# Patient Record
Sex: FEMALE | Race: WHITE | NOT HISPANIC OR LATINO | Employment: OTHER | URBAN - METROPOLITAN AREA
[De-identification: names, ages, dates, MRNs, and addresses within clinical notes are randomized per-mention and may not be internally consistent; named-entity substitution may affect disease eponyms.]

---

## 2023-01-29 PROBLEM — H40.9 GLAUCOMA: Status: ACTIVE | Noted: 2023-01-29

## 2023-01-29 PROBLEM — M54.50 CHRONIC LOW BACK PAIN: Status: ACTIVE | Noted: 2023-01-29

## 2023-01-29 PROBLEM — J02.9 SORE THROAT: Status: ACTIVE | Noted: 2023-01-29

## 2023-01-29 PROBLEM — S32.030S COMPRESSION FRACTURE OF L3 LUMBAR VERTEBRA, SEQUELA: Status: RESOLVED | Noted: 2023-01-29 | Resolved: 2023-01-29

## 2023-01-29 PROBLEM — G89.29 CHRONIC LOW BACK PAIN: Status: ACTIVE | Noted: 2023-01-29

## 2023-01-29 PROBLEM — S32.030S COMPRESSION FRACTURE OF L3 LUMBAR VERTEBRA, SEQUELA: Status: ACTIVE | Noted: 2023-01-29

## 2023-01-29 PROBLEM — E53.8 VITAMIN B12 DEFICIENCY: Status: ACTIVE | Noted: 2023-01-29

## 2023-01-29 PROBLEM — H53.9 VISUAL DISTURBANCE: Status: ACTIVE | Noted: 2023-01-29

## 2023-01-29 PROBLEM — I26.99 PULMONARY EMBOLUS (MULTI): Status: ACTIVE | Noted: 2023-01-29

## 2023-01-29 PROBLEM — E66.3 OVERWEIGHT (BMI 25.0-29.9): Status: ACTIVE | Noted: 2023-01-29

## 2023-01-29 PROBLEM — I10 HTN (HYPERTENSION): Status: ACTIVE | Noted: 2023-01-29

## 2023-01-29 PROBLEM — L70.9 ACNE: Status: ACTIVE | Noted: 2023-01-29

## 2023-01-29 PROBLEM — R68.83 CHILLS: Status: ACTIVE | Noted: 2023-01-29

## 2023-01-29 PROBLEM — M85.80 OSTEOPENIA: Status: ACTIVE | Noted: 2023-01-29

## 2023-01-29 PROBLEM — N39.0 UTI (URINARY TRACT INFECTION): Status: ACTIVE | Noted: 2023-01-29

## 2023-01-29 PROBLEM — R73.9 HYPERGLYCEMIA: Status: ACTIVE | Noted: 2023-01-29

## 2023-01-29 PROBLEM — E01.0 THYROMEGALY: Status: ACTIVE | Noted: 2023-01-29

## 2023-01-29 PROBLEM — H26.9 CATARACT: Status: ACTIVE | Noted: 2023-01-29

## 2023-01-29 PROBLEM — L72.0 EPIDERMAL INCLUSION CYST: Status: ACTIVE | Noted: 2023-01-29

## 2023-01-29 PROBLEM — E55.9 VITAMIN D DEFICIENCY: Status: ACTIVE | Noted: 2023-01-29

## 2023-01-29 PROBLEM — M19.90 OSTEOARTHRITIS: Status: ACTIVE | Noted: 2023-01-29

## 2023-01-29 PROBLEM — J02.9 PHARYNGITIS: Status: ACTIVE | Noted: 2023-01-29

## 2023-01-29 PROBLEM — F32.A DEPRESSION: Status: ACTIVE | Noted: 2023-01-29

## 2023-01-29 PROBLEM — H91.92 UNILATERAL HEARING LOSS, LEFT: Status: ACTIVE | Noted: 2023-01-29

## 2023-01-29 PROBLEM — M51.9 LUMBOSACRAL DISC DISEASE: Status: ACTIVE | Noted: 2023-01-29

## 2023-01-29 PROBLEM — N32.81 OVERACTIVE BLADDER: Status: ACTIVE | Noted: 2023-01-29

## 2023-01-29 PROBLEM — R09.89 CHOKING IN ADULT: Status: ACTIVE | Noted: 2023-01-29

## 2023-01-29 PROBLEM — R92.8 ABNORMAL MAMMOGRAM OF RIGHT BREAST: Status: ACTIVE | Noted: 2023-01-29

## 2023-01-29 PROBLEM — S20.212A RIB CONTUSION, LEFT, INITIAL ENCOUNTER: Status: ACTIVE | Noted: 2023-01-29

## 2023-01-29 PROBLEM — M17.11 PRIMARY OSTEOARTHRITIS OF RIGHT KNEE: Status: ACTIVE | Noted: 2023-01-29

## 2023-01-29 PROBLEM — Z86.718 HISTORY OF DVT (DEEP VEIN THROMBOSIS): Status: ACTIVE | Noted: 2023-01-29

## 2023-01-29 PROBLEM — R42 DIZZINESS: Status: ACTIVE | Noted: 2023-01-29

## 2023-01-29 PROBLEM — K21.9 ACID REFLUX: Status: ACTIVE | Noted: 2023-01-29

## 2023-01-29 PROBLEM — R39.9 UTI SYMPTOMS: Status: ACTIVE | Noted: 2023-01-29

## 2023-01-29 PROBLEM — R26.81 UNSTEADY GAIT: Status: ACTIVE | Noted: 2023-01-29

## 2023-01-29 PROBLEM — R22.1 MASS OF THYROID REGION: Status: ACTIVE | Noted: 2023-01-29

## 2023-01-29 PROBLEM — I65.29 CAROTID STENOSIS: Status: ACTIVE | Noted: 2023-01-29

## 2023-01-29 PROBLEM — I80.3 THROMBOPHLEBITIS OF LOWER EXTREMITIES: Status: ACTIVE | Noted: 2023-01-29

## 2023-01-29 PROBLEM — K44.9 HIATAL HERNIA: Status: ACTIVE | Noted: 2023-01-29

## 2023-01-29 PROBLEM — L30.9 ECZEMATOUS DERMATITIS: Status: ACTIVE | Noted: 2023-01-29

## 2023-01-29 PROBLEM — R73.03 PRE-DIABETES: Status: ACTIVE | Noted: 2023-01-29

## 2023-01-29 PROBLEM — R82.90 ABNORMAL FINDING ON URINALYSIS: Status: ACTIVE | Noted: 2023-01-29

## 2023-01-29 PROBLEM — M81.0 OSTEOPOROSIS: Status: ACTIVE | Noted: 2023-01-29

## 2023-01-29 PROBLEM — F41.9 ANXIETY: Status: ACTIVE | Noted: 2023-01-29

## 2023-01-29 PROBLEM — L72.3 SEBACEOUS CYST OF RIGHT AXILLA: Status: ACTIVE | Noted: 2023-01-29

## 2023-01-29 PROBLEM — N60.19 FIBROCYSTIC BREAST DISEASE: Status: ACTIVE | Noted: 2023-01-29

## 2023-01-29 PROBLEM — G62.9 PERIPHERAL NEUROPATHY: Status: ACTIVE | Noted: 2023-01-29
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
## 2023-01-29 PROBLEM — S80.12XS HEMATOMA OF LEG, LEFT, SEQUELA: Status: ACTIVE | Noted: 2023-01-29

## 2023-01-29 PROBLEM — E73.9 LACTOSE INTOLERANCE IN ADULT: Status: ACTIVE | Noted: 2023-01-29

## 2023-01-29 RX ORDER — NITROFURANTOIN 25; 75 MG/1; MG/1
CAPSULE ORAL
COMMUNITY
Start: 2022-08-18 | End: 2023-04-04 | Stop reason: ALTCHOICE

## 2023-01-29 RX ORDER — ERGOCALCIFEROL 1.25 MG/1
1 CAPSULE ORAL
COMMUNITY
Start: 2022-06-29

## 2023-01-29 RX ORDER — CALCIUM CITRATE/VITAMIN D3 315MG-5MCG
1 TABLET ORAL 2 TIMES DAILY
COMMUNITY

## 2023-01-29 RX ORDER — SPIRONOLACTONE 25 MG
TABLET ORAL
COMMUNITY
Start: 2019-11-20 | End: 2023-05-11 | Stop reason: SDUPTHER

## 2023-01-29 RX ORDER — MAGNESIUM 250 MG
TABLET ORAL
COMMUNITY
End: 2024-03-21 | Stop reason: SDUPTHER

## 2023-01-29 RX ORDER — TIMOLOL MALEATE 5 MG/ML
1 SOLUTION/ DROPS OPHTHALMIC EVERY 12 HOURS
COMMUNITY
Start: 2019-11-20

## 2023-01-29 RX ORDER — TRAVOPROST OPHTHALMIC SOLUTION 0.04 MG/ML
1 SOLUTION OPHTHALMIC NIGHTLY
COMMUNITY
End: 2023-05-11 | Stop reason: SDUPTHER

## 2023-01-29 RX ORDER — EPINEPHRINE 0.22MG
AEROSOL WITH ADAPTER (ML) INHALATION
COMMUNITY

## 2023-01-29 RX ORDER — MECLIZINE HCL 12.5 MG 12.5 MG/1
TABLET ORAL
COMMUNITY
Start: 2021-10-05

## 2023-01-29 RX ORDER — CALCIUM CARB/VITAMIN D3/VIT K1 500-500-40
TABLET,CHEWABLE ORAL
COMMUNITY

## 2023-01-29 RX ORDER — AMLODIPINE BESYLATE 10 MG/1
1 TABLET ORAL DAILY
COMMUNITY
Start: 2019-11-20 | End: 2023-06-22

## 2023-01-29 RX ORDER — WARFARIN SODIUM 5 MG/1
TABLET ORAL
COMMUNITY
Start: 2020-09-23 | End: 2024-04-11 | Stop reason: SDUPTHER

## 2023-01-29 RX ORDER — HYDROCORTISONE 25 MG/G
CREAM TOPICAL
COMMUNITY
Start: 2022-04-20 | End: 2023-07-05

## 2023-01-29 RX ORDER — OMEPRAZOLE 40 MG/1
CAPSULE, DELAYED RELEASE ORAL
COMMUNITY
Start: 2020-01-22 | End: 2024-01-17 | Stop reason: ALTCHOICE

## 2023-01-29 RX ORDER — UBIDECARENONE 75 MG
CAPSULE ORAL
COMMUNITY
End: 2023-05-11 | Stop reason: SDUPTHER

## 2023-01-29 RX ORDER — MUPIROCIN 20 MG/G
OINTMENT TOPICAL
COMMUNITY
Start: 2021-01-27

## 2023-01-29 RX ORDER — OXYBUTYNIN CHLORIDE 10 MG/1
10 TABLET, EXTENDED RELEASE ORAL NIGHTLY
COMMUNITY

## 2023-01-29 RX ORDER — RISEDRONATE SODIUM 35 MG/1
35 TABLET, FILM COATED ORAL
COMMUNITY
End: 2023-06-26 | Stop reason: SDUPTHER

## 2023-01-29 RX ORDER — BACLOFEN 10 MG/1
10 TABLET ORAL 2 TIMES DAILY
COMMUNITY

## 2023-01-29 RX ORDER — AFLIBERCEPT 40 MG/ML
INJECTION, SOLUTION INTRAVITREAL
COMMUNITY
End: 2024-03-21 | Stop reason: ALTCHOICE

## 2023-03-08 LAB — URINE CULTURE: NORMAL

## 2023-03-14 DIAGNOSIS — N95.2 VAGINAL ATROPHY: Primary | ICD-10-CM

## 2023-03-14 RX ORDER — ESTRADIOL 0.1 MG/G
2 CREAM VAGINAL DAILY
Qty: 42.5 G | Refills: 5 | Status: SHIPPED | OUTPATIENT
Start: 2023-03-14 | End: 2024-03-13

## 2023-04-03 RX ORDER — OMEPRAZOLE 20 MG/1
1 TABLET, DELAYED RELEASE ORAL DAILY
COMMUNITY

## 2023-04-03 RX ORDER — MIRABEGRON 25 MG/1
1 TABLET, FILM COATED, EXTENDED RELEASE ORAL DAILY
COMMUNITY
Start: 2023-03-06

## 2023-04-03 RX ORDER — NITROFURANTOIN (MACROCRYSTALS) 100 MG/1
1 CAPSULE ORAL 2 TIMES DAILY
COMMUNITY
Start: 2022-06-29 | End: 2023-04-04 | Stop reason: ALTCHOICE

## 2023-04-03 RX ORDER — PREDNISONE 10 MG/1
TABLET ORAL
COMMUNITY
Start: 2022-08-31 | End: 2024-03-21 | Stop reason: ALTCHOICE

## 2023-04-03 RX ORDER — ASCORBIC ACID 500 MG
TABLET,CHEWABLE ORAL
COMMUNITY
Start: 2023-03-06

## 2023-04-03 RX ORDER — NALOXONE HYDROCHLORIDE 4 MG/.1ML
4 SPRAY NASAL
COMMUNITY
Start: 2022-07-24

## 2023-04-03 RX ORDER — HYDROCODONE BITARTRATE AND ACETAMINOPHEN 5; 325 MG/1; MG/1
TABLET ORAL
COMMUNITY
Start: 2022-07-24 | End: 2024-01-17 | Stop reason: ALTCHOICE

## 2023-04-04 ENCOUNTER — OFFICE VISIT (OUTPATIENT)
Dept: PRIMARY CARE | Facility: CLINIC | Age: 83
End: 2023-04-04
Payer: MEDICARE

## 2023-04-04 VITALS
RESPIRATION RATE: 16 BRPM | DIASTOLIC BLOOD PRESSURE: 80 MMHG | HEART RATE: 63 BPM | OXYGEN SATURATION: 97 % | TEMPERATURE: 97.7 F | BODY MASS INDEX: 26.84 KG/M2 | HEIGHT: 67 IN | WEIGHT: 171 LBS | SYSTOLIC BLOOD PRESSURE: 148 MMHG

## 2023-04-04 DIAGNOSIS — R73.9 HYPERGLYCEMIA: ICD-10-CM

## 2023-04-04 DIAGNOSIS — N30.90 CYSTITIS: Primary | ICD-10-CM

## 2023-04-04 DIAGNOSIS — R39.9 UTI SYMPTOMS: ICD-10-CM

## 2023-04-04 DIAGNOSIS — Z86.718 HISTORY OF DVT (DEEP VEIN THROMBOSIS): ICD-10-CM

## 2023-04-04 LAB
APPEARANCE UR: CLEAR
BILIRUB UR QL STRIP: NEGATIVE
COLOR UR: YELLOW
GLUCOSE UR STRIP-MCNC: NEGATIVE MG/DL
HGB UR QL STRIP: ABNORMAL
KETONES UR STRIP-MCNC: NEGATIVE MG/DL
LEUKOCYTE ESTERASE UR QL STRIP: ABNORMAL
NITRITE UR QL STRIP: NEGATIVE
PH UR STRIP: 6 [PH]
POC FINGERSTICK BLOOD GLUCOSE: 144 MG/DL (ref 70–100)
POC INR: 1.6 (ref 0.9–1.1)
PROT UR STRIP-MCNC: NEGATIVE MG/DL
SP GR UR STRIP.AUTO: 1.01
UROBILINOGEN UR STRIP-ACNC: 0.2 E.U./DL

## 2023-04-04 PROCEDURE — 3077F SYST BP >= 140 MM HG: CPT | Performed by: FAMILY MEDICINE

## 2023-04-04 PROCEDURE — 1160F RVW MEDS BY RX/DR IN RCRD: CPT | Performed by: FAMILY MEDICINE

## 2023-04-04 PROCEDURE — 1036F TOBACCO NON-USER: CPT | Performed by: FAMILY MEDICINE

## 2023-04-04 PROCEDURE — 85610 PROTHROMBIN TIME: CPT | Performed by: FAMILY MEDICINE

## 2023-04-04 PROCEDURE — 1159F MED LIST DOCD IN RCRD: CPT | Performed by: FAMILY MEDICINE

## 2023-04-04 PROCEDURE — 87086 URINE CULTURE/COLONY COUNT: CPT

## 2023-04-04 PROCEDURE — 87186 SC STD MICRODIL/AGAR DIL: CPT

## 2023-04-04 PROCEDURE — 82962 GLUCOSE BLOOD TEST: CPT | Performed by: FAMILY MEDICINE

## 2023-04-04 PROCEDURE — 81003 URINALYSIS AUTO W/O SCOPE: CPT | Performed by: FAMILY MEDICINE

## 2023-04-04 PROCEDURE — 99214 OFFICE O/P EST MOD 30 MIN: CPT | Performed by: FAMILY MEDICINE

## 2023-04-04 PROCEDURE — 3079F DIAST BP 80-89 MM HG: CPT | Performed by: FAMILY MEDICINE

## 2023-04-04 PROCEDURE — 87077 CULTURE AEROBIC IDENTIFY: CPT

## 2023-04-04 RX ORDER — NITROFURANTOIN 25; 75 MG/1; MG/1
100 CAPSULE ORAL DAILY
Qty: 30 CAPSULE | Refills: 0 | Status: SHIPPED | OUTPATIENT
Start: 2023-04-04 | End: 2023-05-04

## 2023-04-04 ASSESSMENT — PAIN SCALES - GENERAL: PAINLEVEL: 2

## 2023-04-04 NOTE — PROGRESS NOTES
Subjective   Janice Avalos is a 82 y.o. female who presents for Follow-up (Patient complains of UTI symptoms).  HPI  : Patient is an 82-year-old female who is in today for recheck on her blood sugar, Coumadin INR and also urinalysis for possible UTI infection.  Patient is seeing urology and was started on Myrbetriq 25 mg daily, however she still thinks she has urinary tract symptoms like burning and frequency.  We will check a urinalysis today.  She also follows with retinal specialist and is receiving injections in her eyes for macular degeneration.  Patient does have an upcoming appointment with urology in 2 weeks and they will check her urine to get to see if the infection is cleared.  I will start her on nitrofurantoin 100 mg daily for 30 days.      Objective  : ROS10 systems were reviewed and the information is included in the HPI and no additional review of systems is indicated.    Physical Exam  Vitals and nursing note reviewed.   Constitutional:       Appearance: Normal appearance. She is normal weight.      Comments: Patient is alert and oriented x3 but does have visual problems due to macular degeneration.   HENT:      Head: Normocephalic.      Right Ear: Tympanic membrane and external ear normal.      Left Ear: Tympanic membrane and external ear normal.      Nose: Nose normal.      Mouth/Throat:      Mouth: Mucous membranes are moist.      Pharynx: Oropharynx is clear.      Comments: Mouth is moist tongue is midline.  No posterior pharyngeal erythema.  Eyes:      Extraocular Movements: Extraocular movements intact.      Conjunctiva/sclera: Conjunctivae normal.      Pupils: Pupils are equal, round, and reactive to light.      Comments: Patient does wear thick dark sunglasses due to macular degeneration and she is bothered by the sunlight.   Neck:      Comments: Occasional neck spasm and restriction of motion secondary to stress and tension.  Cardiovascular:      Rate and Rhythm: Normal rate and regular  rhythm.      Pulses: Normal pulses.      Heart sounds: Normal heart sounds.      Comments: Heart rhythm is stable S1 and S2 are noted, no ectopics.  History of pulmonary emboli and DVTs.  Currently no calf pain and minimal swelling.  Patient does wear support stockings.  She has been on Coumadin for several years.  Pulmonary:      Effort: Pulmonary effort is normal.      Comments: Shallow depth of inspiration but lungs are clear.  Abdominal:      General: Bowel sounds are normal.      Palpations: Abdomen is soft.      Comments: Abdomen is soft and nontender, no hepatosplenomegaly.   Genitourinary:     Comments: Not examined.  Does have dysuria symptoms and frequency.  Is seeing urology and was started on Myrbetriq.  Musculoskeletal:         General: Normal range of motion.      Cervical back: Normal range of motion.      Comments: Arthritis in her hips and knees, occasionally has to use a cane for ambulation.  Does have lumbosacral disc disease periodic sciatica.  Patient occasionally uses her walker at home.  Her balance is not 100% and she has to be cautious of ambulation.  She has fallen previously and does have a compressed vertebrae.  Very mild ankle edema and does wear support stockings.   Skin:     General: Skin is warm.   Neurological:      General: No focal deficit present.      Mental Status: She is alert and oriented to person, place, and time. Mental status is at baseline.      Deep Tendon Reflexes: Reflexes abnormal.      Comments: Patient does have some peripheral neuropathy and chronic lumbosacral disc disease with occasional sciatica.  Currently is ambulating without any assistive device but does have a walker at home.   Psychiatric:         Behavior: Behavior normal.         Thought Content: Thought content normal.         Judgment: Judgment normal.      Comments: Patient does have anxiety and worry concerning her eyesight since it has slowly deteriorated.  She is receiving injections in her eyes by  retinal specialty.     PLAN  ; patient is a 82-year-old female who was evaluated for several problems and concerns.  She did have some urinary symptoms and we checked a urinalysis, pH of 5.0, specific gravity 1.010, moderate leukocytes, negative protein, trace of blood, negative glucose.  Patient will be treated for a UTI and her urine will be cultured.  She has an appointment with urology in 2 weeks and they will also recheck her urine.  I did start her on nitrofurantoin 100 mg daily for 30 days.  Patient also had a blood sugar checked and it was 144 and her INR was 1.6%.  She will take her Coumadin a full pill Tuesday and Thursday and half a pill the other days.  Patient otherwise is stable and will follow-up in 2 months or sooner as needed.    Problem List Items Addressed This Visit          Genitourinary    UTI symptoms    Relevant Orders    POCT UA (Automated) docked device    Urine Culture       Other    History of DVT (deep vein thrombosis)    Relevant Orders    POCT INR manually resulted    Hyperglycemia    Relevant Orders    POCT fingerstick glucose manually resulted            David Granados DO

## 2023-04-06 LAB — URINE CULTURE: ABNORMAL

## 2023-04-07 ENCOUNTER — TELEPHONE (OUTPATIENT)
Dept: PRIMARY CARE | Facility: CLINIC | Age: 83
End: 2023-04-07
Payer: MEDICARE

## 2023-04-07 NOTE — TELEPHONE ENCOUNTER
Patient is questioning if she should use the Estradiol cream prescribed to her to help with UTI as she has a large history of blood clots.  Can you advise?

## 2023-04-09 DIAGNOSIS — N30.90 CYSTITIS: Primary | ICD-10-CM

## 2023-04-09 RX ORDER — CIPROFLOXACIN 500 MG/1
500 TABLET ORAL 2 TIMES DAILY
Qty: 10 TABLET | Refills: 0 | Status: SHIPPED | OUTPATIENT
Start: 2023-04-09 | End: 2023-04-14

## 2023-04-09 NOTE — RESULT ENCOUNTER NOTE
Patient has a low-grade infection      and her antibiotic should be switched to Cipro 500 mg twice a day for 7 days.   If she is already taking nitrofurantoin she can stop that and we will check her urine next time she comes in.         She should also continue to follow-up with urology.

## 2023-05-11 RX ORDER — BILBERRY 100 MG
CAPSULE ORAL
COMMUNITY
Start: 2019-02-21 | End: 2023-05-11 | Stop reason: SDUPTHER

## 2023-05-11 RX ORDER — LANOLIN ALCOHOL/MO/W.PET/CERES
CREAM (GRAM) TOPICAL
COMMUNITY
Start: 2019-02-21

## 2023-05-11 RX ORDER — VIBEGRON 75 MG/1
1 TABLET, FILM COATED ORAL DAILY
COMMUNITY
Start: 2023-04-13 | End: 2024-03-21 | Stop reason: ALTCHOICE

## 2023-05-11 RX ORDER — BILBERRY 100 MG
CAPSULE ORAL
COMMUNITY
Start: 2010-08-31

## 2023-05-11 RX ORDER — PLANT STANOL ESTER 450 MG
TABLET ORAL
COMMUNITY

## 2023-05-11 RX ORDER — FLUTICASONE PROPIONATE 50 MCG
SPRAY, SUSPENSION (ML) NASAL
COMMUNITY
Start: 2018-01-15

## 2023-05-11 RX ORDER — CYANOCOBALAMIN (VITAMIN B-12) 1000 MCG
45 TABLET, EXTENDED RELEASE ORAL
COMMUNITY

## 2023-05-11 RX ORDER — PANTOPRAZOLE SODIUM 20 MG/1
TABLET, DELAYED RELEASE ORAL
COMMUNITY

## 2023-05-11 RX ORDER — PERPHENAZINE/AMITRIPTYLINE HCL 2 MG-10 MG
TABLET ORAL
COMMUNITY

## 2023-05-11 RX ORDER — TRAVOPROST OPHTHALMIC SOLUTION 0.04 MG/ML
SOLUTION OPHTHALMIC
COMMUNITY

## 2023-05-11 RX ORDER — HYDROCODONE/ACETAMINOPHEN 5 MG-500MG
TABLET ORAL
COMMUNITY
Start: 2019-02-21

## 2023-05-15 ENCOUNTER — OFFICE VISIT (OUTPATIENT)
Dept: PRIMARY CARE | Facility: CLINIC | Age: 83
End: 2023-05-15
Payer: MEDICARE

## 2023-05-15 VITALS
OXYGEN SATURATION: 92 % | DIASTOLIC BLOOD PRESSURE: 73 MMHG | SYSTOLIC BLOOD PRESSURE: 140 MMHG | RESPIRATION RATE: 14 BRPM | TEMPERATURE: 97.9 F | WEIGHT: 167 LBS | HEART RATE: 62 BPM | BODY MASS INDEX: 26.21 KG/M2 | HEIGHT: 67 IN

## 2023-05-15 DIAGNOSIS — H53.9 VISUAL DISTURBANCE: Primary | ICD-10-CM

## 2023-05-15 DIAGNOSIS — I10 PRIMARY HYPERTENSION: ICD-10-CM

## 2023-05-15 DIAGNOSIS — M15.8 OTHER OSTEOARTHRITIS INVOLVING MULTIPLE JOINTS: ICD-10-CM

## 2023-05-15 DIAGNOSIS — G62.89 OTHER POLYNEUROPATHY: ICD-10-CM

## 2023-05-15 DIAGNOSIS — R73.03 PRE-DIABETES: ICD-10-CM

## 2023-05-15 DIAGNOSIS — M17.11 PRIMARY OSTEOARTHRITIS OF RIGHT KNEE: ICD-10-CM

## 2023-05-15 DIAGNOSIS — G89.29 CHRONIC BILATERAL LOW BACK PAIN WITH SCIATICA, SCIATICA LATERALITY UNSPECIFIED: ICD-10-CM

## 2023-05-15 DIAGNOSIS — K21.00 GASTROESOPHAGEAL REFLUX DISEASE WITH ESOPHAGITIS WITHOUT HEMORRHAGE: ICD-10-CM

## 2023-05-15 DIAGNOSIS — R35.0 URINARY FREQUENCY: ICD-10-CM

## 2023-05-15 DIAGNOSIS — Z86.718 HISTORY OF DVT (DEEP VEIN THROMBOSIS): ICD-10-CM

## 2023-05-15 DIAGNOSIS — E73.9 LACTOSE INTOLERANCE: ICD-10-CM

## 2023-05-15 DIAGNOSIS — I82.403 DEEP VEIN THROMBOSIS (DVT) OF BOTH LOWER EXTREMITIES, UNSPECIFIED CHRONICITY, UNSPECIFIED VEIN (MULTI): ICD-10-CM

## 2023-05-15 DIAGNOSIS — M54.40 CHRONIC BILATERAL LOW BACK PAIN WITH SCIATICA, SCIATICA LATERALITY UNSPECIFIED: ICD-10-CM

## 2023-05-15 LAB
APPEARANCE UR: CLEAR
BILIRUB UR QL STRIP: NEGATIVE
COLOR UR: YELLOW
GLUCOSE UR STRIP-MCNC: NEGATIVE MG/DL
HGB UR QL STRIP: NEGATIVE
KETONES UR STRIP-MCNC: NEGATIVE MG/DL
LEUKOCYTE ESTERASE UR QL STRIP: ABNORMAL
NITRITE UR QL STRIP: NEGATIVE
PH UR STRIP: 7.5 [PH]
POC FINGERSTICK BLOOD GLUCOSE: 143 MG/DL (ref 70–100)
POC INR: 1.7 (ref 0.9–1.1)
PROT UR STRIP-MCNC: NEGATIVE MG/DL
SP GR UR STRIP.AUTO: 1.01
UROBILINOGEN UR STRIP-ACNC: 0.2 E.U./DL

## 2023-05-15 PROCEDURE — 82962 GLUCOSE BLOOD TEST: CPT | Performed by: FAMILY MEDICINE

## 2023-05-15 PROCEDURE — 1159F MED LIST DOCD IN RCRD: CPT | Performed by: FAMILY MEDICINE

## 2023-05-15 PROCEDURE — 1036F TOBACCO NON-USER: CPT | Performed by: FAMILY MEDICINE

## 2023-05-15 PROCEDURE — 3078F DIAST BP <80 MM HG: CPT | Performed by: FAMILY MEDICINE

## 2023-05-15 PROCEDURE — 87086 URINE CULTURE/COLONY COUNT: CPT

## 2023-05-15 PROCEDURE — 3077F SYST BP >= 140 MM HG: CPT | Performed by: FAMILY MEDICINE

## 2023-05-15 PROCEDURE — 99214 OFFICE O/P EST MOD 30 MIN: CPT | Performed by: FAMILY MEDICINE

## 2023-05-15 PROCEDURE — 81003 URINALYSIS AUTO W/O SCOPE: CPT | Performed by: FAMILY MEDICINE

## 2023-05-15 PROCEDURE — 1160F RVW MEDS BY RX/DR IN RCRD: CPT | Performed by: FAMILY MEDICINE

## 2023-05-15 PROCEDURE — 87077 CULTURE AEROBIC IDENTIFY: CPT

## 2023-05-15 PROCEDURE — 85610 PROTHROMBIN TIME: CPT | Performed by: FAMILY MEDICINE

## 2023-05-15 ASSESSMENT — PAIN SCALES - GENERAL: PAINLEVEL: 4

## 2023-05-15 NOTE — PROGRESS NOTES
Subjective   Janice Avalos is a 82 y.o. female who presents for Follow-up.    HPI  : Patient is an 82-year-old female who is in for recheck on blood pressure, blood sugar and Coumadin INR.  She is borderline diabetic and likes to keep track of her blood sugar.  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  Patient also has had frequent urinary infections and we will recheck a urine on her.  She is following with urology and she states her overactive bladder symptoms have improved.  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      Objective  ROS  :10 systems were reviewed and the information is included in the HPI and no additional review of systems is indicated.    Physical Exam  Constitutional:       Appearance: Normal appearance. She is normal weight.      Comments: Patient is alert and oriented but does have vision problems and follows with a retinal specialist.   HENT:      Head: Normocephalic.      Right Ear: Tympanic membrane and ear canal normal.      Left Ear: Tympanic membrane and ear canal normal.      Nose: Nose normal.   Eyes:      Extraocular Movements: Extraocular movements intact.      Conjunctiva/sclera: Conjunctivae normal.      Pupils: Pupils are equal, round, and reactive to light.      Comments: Patient follows with retinal specialist due to macular degeneration.  She does have diminished vision and is afraid she will remove her 's license due to vision problems.   Neck:      Comments: Mild restriction of motion cervical spine due to arthritis.  No thyromegaly.  Cardiovascular:      Rate and Rhythm: Normal rate and regular rhythm.      Heart sounds: Normal heart sounds.      Comments: Heart rhythm is stable, S1 and S2 are noted.  Patient denies chest pain and no palpitations.  Pulmonary:      Comments: History of pulmonary embolus a few years ago.  Breathing is currently stable  and she is not short of breath.  Denies productive cough and no hemoptysis.  Lungs are essentially clear to auscultation.  No wheezing.  Abdominal:      General: Bowel sounds are normal.      Palpations: Abdomen is soft.      Comments: Abdomen is soft and nontender.  She does have a hiatal hernia and gastroesophageal reflux.  Denies any nausea or vomiting.  No constipation or diarrhea at this time.   Genitourinary:     Comments: Patient follows with urology for overactive bladder and frequent urinary tract infections.  She is having her urine checked today.  Musculoskeletal:         General: Swelling and tenderness present.      Comments: Has chronic back trouble due to previous L3 compression fracture.  Also has lumbosacral disc disease.  Arthritis in her hips and knees.  Ambulates with a cane and has unsteady gait.  Also has osteoarthritis of the fingers.   Skin:     General: Skin is warm.   Neurological:      Mental Status: She is alert and oriented to person, place, and time. Mental status is at baseline.      Comments: Patient does have some peripheral neuropathy which does affect her ambulation.  She has no focal neurologic signs from any CVAs.  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  Ambulates with a cane due to unsteady gait.   Psychiatric:         Mood and Affect: Mood normal.         Behavior: Behavior normal.         Thought Content: Thought content normal.         Judgment: Judgment normal.      Comments: Patient has normal mood and affect.  Normal thought content and judgment.  No anxiety or depression at this time but does worry about what is happening in the world and with the news.  She states she does not like to watch the news any longer due to all the killings and work.  Patient is very realistic for the age of 82.     PLAN : Patient was evaluated today for recheck on blood pressure, blood sugar, Coumadin level and urine.  Her urinalysis showed a pH of 7.5, specific gravity  was 1.015, small trace of leukocytes, negative protein, negative blood, negative glucose.  Her blood sugar today was borderline at 143 and her INR was 1.7%.  The urinalysis will be cultured just to make sure she does not have a UTI infection.  She takes her Coumadin 5 mg on Tuesday and Thursday, 2.5 mg on the other days.  Patient otherwise is feeling better and her back pain has improved from the compression fracture.  Her ambulation has also improved and she continues to use a cane so she does not fall.  She will follow-up in 2 to 3 months or sooner as needed.    Problem List Items Addressed This Visit          Nervous    Peripheral neuropathy       Circulatory    HTN (hypertension)       Musculoskeletal    Osteoarthritis    Primary osteoarthritis of right knee       Endocrine/Metabolic    Pre-diabetes    Relevant Orders    POCT fingerstick glucose manually resulted       Other    Chronic low back pain    History of DVT (deep vein thrombosis)    Relevant Orders    POCT INR manually resulted    Visual disturbance - Primary     Other Visit Diagnoses       Urinary frequency        Relevant Orders    POCT UA (Automated) docked device                 David Granados,

## 2023-05-17 LAB — URINE CULTURE: ABNORMAL

## 2023-05-18 ENCOUNTER — APPOINTMENT (OUTPATIENT)
Dept: PRIMARY CARE | Facility: CLINIC | Age: 83
End: 2023-05-18
Payer: MEDICARE

## 2023-05-18 ENCOUNTER — TELEPHONE (OUTPATIENT)
Dept: PRIMARY CARE | Facility: CLINIC | Age: 83
End: 2023-05-18

## 2023-05-18 NOTE — RESULT ENCOUNTER NOTE
The urine culture is stable        just try to drink a lot of water        no need for any more antibiotics.

## 2023-05-18 NOTE — TELEPHONE ENCOUNTER
----- Message from David Granados DO sent at 5/18/2023 11:38 AM EDT -----  The urine culture is stable        just try to drink a lot of water        no need for any more antibiotics.

## 2023-06-19 DIAGNOSIS — S32.000A LUMBAR COMPRESSION FRACTURE, CLOSED, INITIAL ENCOUNTER (MULTI): Primary | ICD-10-CM

## 2023-06-19 DIAGNOSIS — S32.040A CLOSED COMPRESSION FRACTURE OF L4 VERTEBRA, INITIAL ENCOUNTER (MULTI): ICD-10-CM

## 2023-06-19 DIAGNOSIS — S32.010A CLOSED COMPRESSION FRACTURE OF BODY OF L1 VERTEBRA (MULTI): ICD-10-CM

## 2023-06-19 RX ORDER — ACETAMINOPHEN AND CODEINE PHOSPHATE 300; 30 MG/1; MG/1
1 TABLET ORAL EVERY 4 HOURS PRN
Qty: 20 TABLET | Refills: 1 | Status: SHIPPED | OUTPATIENT
Start: 2023-06-19 | End: 2023-06-26

## 2023-06-22 DIAGNOSIS — I10 PRIMARY HYPERTENSION: Primary | ICD-10-CM

## 2023-06-22 RX ORDER — AMLODIPINE BESYLATE 10 MG/1
TABLET ORAL
Qty: 90 TABLET | Refills: 3 | Status: SHIPPED | OUTPATIENT
Start: 2023-06-22

## 2023-06-26 ENCOUNTER — OFFICE VISIT (OUTPATIENT)
Dept: PRIMARY CARE | Facility: CLINIC | Age: 83
End: 2023-06-26
Payer: MEDICARE

## 2023-06-26 VITALS
SYSTOLIC BLOOD PRESSURE: 130 MMHG | HEART RATE: 68 BPM | DIASTOLIC BLOOD PRESSURE: 73 MMHG | BODY MASS INDEX: 26.06 KG/M2 | RESPIRATION RATE: 14 BRPM | OXYGEN SATURATION: 96 % | TEMPERATURE: 97.9 F | WEIGHT: 166 LBS | HEIGHT: 67 IN

## 2023-06-26 DIAGNOSIS — M51.9 LUMBOSACRAL DISC DISEASE: ICD-10-CM

## 2023-06-26 DIAGNOSIS — I10 PRIMARY HYPERTENSION: ICD-10-CM

## 2023-06-26 DIAGNOSIS — R73.9 HYPERGLYCEMIA: ICD-10-CM

## 2023-06-26 DIAGNOSIS — G89.29 CHRONIC LOW BACK PAIN WITH SCIATICA, SCIATICA LATERALITY UNSPECIFIED, UNSPECIFIED BACK PAIN LATERALITY: ICD-10-CM

## 2023-06-26 DIAGNOSIS — S32.010A CLOSED COMPRESSION FRACTURE OF BODY OF L1 VERTEBRA (MULTI): Primary | ICD-10-CM

## 2023-06-26 DIAGNOSIS — N32.81 OVERACTIVE BLADDER: ICD-10-CM

## 2023-06-26 DIAGNOSIS — S32.040A CLOSED COMPRESSION FRACTURE OF L4 VERTEBRA, INITIAL ENCOUNTER (MULTI): ICD-10-CM

## 2023-06-26 DIAGNOSIS — G61.9 INFLAMMATORY NEUROPATHY (MULTI): ICD-10-CM

## 2023-06-26 DIAGNOSIS — M54.40 CHRONIC LOW BACK PAIN WITH SCIATICA, SCIATICA LATERALITY UNSPECIFIED, UNSPECIFIED BACK PAIN LATERALITY: ICD-10-CM

## 2023-06-26 DIAGNOSIS — I26.99 PULMONARY EMBOLISM, UNSPECIFIED CHRONICITY, UNSPECIFIED PULMONARY EMBOLISM TYPE, UNSPECIFIED WHETHER ACUTE COR PULMONALE PRESENT (MULTI): ICD-10-CM

## 2023-06-26 DIAGNOSIS — R73.03 PRE-DIABETES: ICD-10-CM

## 2023-06-26 DIAGNOSIS — M80.00XD AGE-RELATED OSTEOPOROSIS WITH CURRENT PATHOLOGICAL FRACTURE WITH ROUTINE HEALING, SUBSEQUENT ENCOUNTER: ICD-10-CM

## 2023-06-26 LAB
POC FINGERSTICK BLOOD GLUCOSE: 123 MG/DL (ref 70–100)
POC INR: 2 (ref 0.9–1.1)

## 2023-06-26 PROCEDURE — 1036F TOBACCO NON-USER: CPT | Performed by: FAMILY MEDICINE

## 2023-06-26 PROCEDURE — 82962 GLUCOSE BLOOD TEST: CPT | Performed by: FAMILY MEDICINE

## 2023-06-26 PROCEDURE — 1159F MED LIST DOCD IN RCRD: CPT | Performed by: FAMILY MEDICINE

## 2023-06-26 PROCEDURE — 99214 OFFICE O/P EST MOD 30 MIN: CPT | Performed by: FAMILY MEDICINE

## 2023-06-26 PROCEDURE — 3075F SYST BP GE 130 - 139MM HG: CPT | Performed by: FAMILY MEDICINE

## 2023-06-26 PROCEDURE — 3078F DIAST BP <80 MM HG: CPT | Performed by: FAMILY MEDICINE

## 2023-06-26 PROCEDURE — 1160F RVW MEDS BY RX/DR IN RCRD: CPT | Performed by: FAMILY MEDICINE

## 2023-06-26 PROCEDURE — 85610 PROTHROMBIN TIME: CPT | Performed by: FAMILY MEDICINE

## 2023-06-26 RX ORDER — RISEDRONATE SODIUM 35 MG/1
35 TABLET, FILM COATED ORAL
Qty: 12 TABLET | Refills: 3 | Status: SHIPPED | OUTPATIENT
Start: 2023-06-26

## 2023-06-26 ASSESSMENT — PAIN SCALES - GENERAL: PAINLEVEL: 6

## 2023-06-26 NOTE — PROGRESS NOTES
Subjective   Janice Avalos is a 82 y.o. female who presents for Follow-up.  Lumbar compression fx.    HPI  :   Patient is an 82-year-old female who has significant osteoporosis and went to the emergency urgent care center about 10 days ago and was found to have 2 new compression fractures in her low back at L1 and L4.  She had old compression fractures at L2 and L3 but she apparently has new ones that have just occurred.  She is walking with her walker and has tried wearing a support brace.  Patient was given some Tylenol with codeine to help with the pain and she states she only took about 4 of those and it did help.  She has not been very active since and is sitting around in her lounge chair at home due to chronic back pain.  She does state that her urinary tract infection is gone and her urine is much better without any burning or discomfort.  Patient also will have her sugar and Coumadin INR rechecked today.  She is on Coumadin due to previous recurrent DVTs in the legs and also pulmonary emboli.    Objective :  ROS  :10 systems were reviewed and the information is included in the HPI and no additional review of systems is indicated.    Physical Exam  Vitals and nursing note reviewed.   Constitutional:       Appearance: Normal appearance.      Comments: Patient is alert and oriented x3.  Ambulates with a walker due to chronic back pain and new compression fractures.   HENT:      Head: Normocephalic.      Right Ear: Tympanic membrane and ear canal normal.      Left Ear: Tympanic membrane and ear canal normal.      Ears:      Comments: Does have diminished hearing.     Nose: Nose normal.      Comments: Denies any nasal congestion but some rhinorrhea.     Mouth/Throat:      Mouth: Mucous membranes are moist.      Pharynx: Oropharynx is clear.      Comments: Mouth is moist, tongue is midline, no posterior pharyngeal erythema.  Eyes:      Extraocular Movements: Extraocular movements intact.      Conjunctiva/sclera:  Conjunctivae normal.      Pupils: Pupils are equal, round, and reactive to light.      Comments: Patient does have visual problems and follows with a retinal specialist.  She has to wear sunglasses out in the sun.   Neck:      Comments: Neck is supple, no JVD, normal range of motion.  Cardiovascular:      Rate and Rhythm: Normal rate and regular rhythm.      Heart sounds: Normal heart sounds.      Comments: Patient denies any chest pain or palpitations.  Does have distant heart sounds S1 and S2 are noted.  No ectopics.  No murmurs.  Pulmonary:      Effort: Pulmonary effort is normal.      Breath sounds: Normal breath sounds.      Comments: Patient has history of pulmonary emboli and is on Coumadin.  Normal inspiration and expiration are normal.  Denies productive cough or hemoptysis.  Lungs are clear to auscultation.  Abdominal:      General: Bowel sounds are normal.      Palpations: Abdomen is soft.      Comments: Patient does have symptoms of irritable bowel syndrome with alternating diarrhea and constipation.  Denies any significant tenderness or guarding, no rebound tenderness and no flank pain.   Genitourinary:     Comments: Patient states her UTI infections have resolved.  She denies any flank pain, does occasionally have some suprapubic discomfort but no dysuria and no hematuria.  Musculoskeletal:         General: Tenderness present.      Cervical back: Normal range of motion and neck supple.      Right lower leg: Edema present.      Left lower leg: Edema present.      Comments: Patient has restriction of motion with new compression fractures at L1 and L4.  She has old compressions at L2 and L3.  She occasionally wears her back Velcro brace when she is at home.  Does have arthritis throughout her joints which are age-related.  Also has bilateral ankle edema.  Ambulates with a walker.   Skin:     General: Skin is dry.      Findings: Rash present.      Comments: Skin is very dry especially on the lower legs.   She has some venous stasis with bilateral ankle edema.  No bruising or lesions noted.  Patient states she does have an irritated skin rash on the buttocks from sitting so much and she is applying [employer].   Neurological:      Mental Status: She is alert.      Comments: Does have lumbosacral disc disease with paresthesias in the buttocks and legs.  Also has peripheral neuropathy in the legs and feet.  Ambulates with a walker.  Gait is slow and steady.  No focal neurologic deficits noted.   Psychiatric:         Behavior: Behavior normal.         Thought Content: Thought content normal.         Judgment: Judgment normal.      Comments: Patient does have anxiety with her current compression fractures in the lumbar spine.  She is not depressed but does have anxiety.  Thought content and judgment are stable.  Normal behavior.     PLAN  : Patient is an 82-year-old elderly female who has 2 new compression fractures in her lumbar spine at L1 and L4.  These are nontraumatic and she did not fall.  She suddenly had severe pain in her low back and went to the emergency department and had x-rays taken.  She occasionally takes a Tylenol with codeine for pain but otherwise apply some ice and does have a lumbar support brace at home she can wear.  Her blood sugar today was 123 and her INR was 2.0.  She will continue on her medication for osteoporosis and only take the Tylenol with codeine as needed, she will follow-up in 8 weeks or sooner if needed.  She is not interested in any kyphoplasty or surgery on her compression fractures.  Patient will continue with her lumbar support brace, ice locally, and only take pain pills as needed.    Problem List Items Addressed This Visit       Chronic low back pain    Relevant Medications    risedronate (Actonel) 35 mg tablet    HTN (hypertension)    Hyperglycemia    Relevant Orders    POCT fingerstick glucose manually resulted (Completed)    Lumbosacral disc disease    Peripheral neuropathy     Pre-diabetes    Pulmonary embolus (CMS/HCC)    Relevant Orders    POCT INR manually resulted (Completed)    Overactive bladder    Osteoporosis    Relevant Medications    risedronate (Actonel) 35 mg tablet    Closed compression fracture of body of L1 vertebra (CMS/HCC) - Primary    Relevant Medications    risedronate (Actonel) 35 mg tablet    Closed compression fracture of fourth lumbar vertebra (CMS/HCC)    Relevant Medications    risedronate (Actonel) 35 mg tablet            David Granados, DO

## 2023-07-04 DIAGNOSIS — R21 RASH: Primary | ICD-10-CM

## 2023-07-05 RX ORDER — HYDROCORTISONE 25 MG/G
CREAM TOPICAL
Qty: 28.35 G | Refills: 0 | Status: SHIPPED | OUTPATIENT
Start: 2023-07-05 | End: 2023-08-26

## 2023-08-25 DIAGNOSIS — R21 RASH: ICD-10-CM

## 2023-08-26 RX ORDER — HYDROCORTISONE 25 MG/G
CREAM TOPICAL
Qty: 28.35 G | Refills: 1 | Status: SHIPPED | OUTPATIENT
Start: 2023-08-26

## 2023-08-29 ENCOUNTER — OFFICE VISIT (OUTPATIENT)
Dept: PRIMARY CARE | Facility: CLINIC | Age: 83
End: 2023-08-29
Payer: MEDICARE

## 2023-08-29 VITALS
TEMPERATURE: 97.7 F | HEIGHT: 67 IN | DIASTOLIC BLOOD PRESSURE: 75 MMHG | HEART RATE: 66 BPM | OXYGEN SATURATION: 95 % | WEIGHT: 163 LBS | RESPIRATION RATE: 16 BRPM | BODY MASS INDEX: 25.58 KG/M2 | SYSTOLIC BLOOD PRESSURE: 124 MMHG

## 2023-08-29 DIAGNOSIS — S32.000D LUMBAR COMPRESSION FRACTURE, WITH ROUTINE HEALING, SUBSEQUENT ENCOUNTER: ICD-10-CM

## 2023-08-29 DIAGNOSIS — R73.9 HYPERGLYCEMIA: ICD-10-CM

## 2023-08-29 DIAGNOSIS — M51.9 LUMBOSACRAL DISC DISEASE: ICD-10-CM

## 2023-08-29 DIAGNOSIS — M54.50 CHRONIC MIDLINE LOW BACK PAIN WITHOUT SCIATICA: ICD-10-CM

## 2023-08-29 DIAGNOSIS — N32.81 OVERACTIVE BLADDER: ICD-10-CM

## 2023-08-29 DIAGNOSIS — H53.9 VISUAL DISTURBANCE: Primary | ICD-10-CM

## 2023-08-29 DIAGNOSIS — M80.00XD AGE-RELATED OSTEOPOROSIS WITH CURRENT PATHOLOGICAL FRACTURE WITH ROUTINE HEALING, SUBSEQUENT ENCOUNTER: ICD-10-CM

## 2023-08-29 DIAGNOSIS — G89.29 CHRONIC MIDLINE LOW BACK PAIN WITHOUT SCIATICA: ICD-10-CM

## 2023-08-29 DIAGNOSIS — Z79.01 ANTICOAGULATED ON COUMADIN: ICD-10-CM

## 2023-08-29 DIAGNOSIS — R73.03 PRE-DIABETES: ICD-10-CM

## 2023-08-29 DIAGNOSIS — I27.82 CHRONIC PULMONARY EMBOLISM WITHOUT ACUTE COR PULMONALE, UNSPECIFIED PULMONARY EMBOLISM TYPE (MULTI): ICD-10-CM

## 2023-08-29 LAB
POC FINGERSTICK BLOOD GLUCOSE: 124 MG/DL (ref 70–100)
POC INR: 1.8 (ref 0.9–1.1)

## 2023-08-29 PROCEDURE — 99214 OFFICE O/P EST MOD 30 MIN: CPT | Performed by: FAMILY MEDICINE

## 2023-08-29 PROCEDURE — 1126F AMNT PAIN NOTED NONE PRSNT: CPT | Performed by: FAMILY MEDICINE

## 2023-08-29 PROCEDURE — 85610 PROTHROMBIN TIME: CPT | Performed by: FAMILY MEDICINE

## 2023-08-29 PROCEDURE — 1159F MED LIST DOCD IN RCRD: CPT | Performed by: FAMILY MEDICINE

## 2023-08-29 PROCEDURE — 1160F RVW MEDS BY RX/DR IN RCRD: CPT | Performed by: FAMILY MEDICINE

## 2023-08-29 PROCEDURE — 1036F TOBACCO NON-USER: CPT | Performed by: FAMILY MEDICINE

## 2023-08-29 PROCEDURE — 3074F SYST BP LT 130 MM HG: CPT | Performed by: FAMILY MEDICINE

## 2023-08-29 PROCEDURE — 82962 GLUCOSE BLOOD TEST: CPT | Performed by: FAMILY MEDICINE

## 2023-08-29 PROCEDURE — 3078F DIAST BP <80 MM HG: CPT | Performed by: FAMILY MEDICINE

## 2023-08-29 ASSESSMENT — PAIN SCALES - GENERAL: PAINLEVEL: 0-NO PAIN

## 2023-08-29 NOTE — PROGRESS NOTES
Subjective   Janice Avalos is a 82 y.o. female who presents for Follow-up (Coumadin check).  Compression fractures    HPI  : Patient 82-year-old female in for recheck on her recent lumbar compression fractures at L1 and L4, she also needs her sugar rechecked and her Coumadin INR.  She did go to the emergency department back in June after suffering severe back pain by apparently just bending over and she had 2 new compression fractures at L1 and L4.  She has old compression fractures at L2 and L3 and she is taking medication for osteoporosis.  Patient does wear a back brace most of the time and at the time of her problem in June she was given some pain medication.  She states it has improved and she has to be very careful on her bending and lifting.      Objective     Physical Exam  Vitals and nursing note reviewed.   Constitutional:       Appearance: Normal appearance.      Comments: Patient is alert and oriented x3.   Does have chronic back pain from new compression fractures at L1 and L4.   HENT:      Head: Normocephalic.      Right Ear: Tympanic membrane and external ear normal.      Left Ear: Tympanic membrane and external ear normal.      Ears:      Comments: Ears are patent bilaterally and TMs are clear.     Nose: Nose normal.      Mouth/Throat:      Mouth: Mucous membranes are moist.      Pharynx: Oropharynx is clear.      Comments: Mouth is moist, tongue is midline.  No posterior pharyngeal erythema.  Eyes:      Extraocular Movements: Extraocular movements intact.      Conjunctiva/sclera: Conjunctivae normal.      Pupils: Pupils are equal, round, and reactive to light.      Comments: Patient has many visual problems and follows closely with ophthalmology and retinal specialist.   Neck:      Comments: No carotid bruits, no thyromegaly, no cervical adenopathy.  Occasional neck spasm and restriction of motion secondary to arthritis,  stress and tension.  Cardiovascular:      Rate and Rhythm: Normal rate and  regular rhythm.      Pulses: Normal pulses.      Heart sounds: Normal heart sounds.      Comments: Patient denies chest pain and no palpitations.  Heart rhythm is stable S1 and S2 are noted, no ectopics.  Pulmonary:      Effort: Pulmonary effort is normal.      Comments: Patient denies any coughing or wheezing.  Lungs are clear to auscultation.    Abdominal:      General: Bowel sounds are normal.      Palpations: Abdomen is soft.      Comments:  No flank tenderness.  No suprapubic pain.  Positive bowel sounds x4.  No abdominal guarding and no rebound tenderness.   Genitourinary:     Comments: Patient has overactive bladder issues and does see urology.  Does have nocturia and frequency.  Musculoskeletal:         General: Tenderness present.      Comments: Patient has a new L1 and L4 compression fracture.  Restricted range of motion and she does wear a back brace.  Previous compression fractures were L3-L4.  Age-related arthritis in the joints.  Mild restriction of motion cervical and lumbar spines due to muscle spasm.   Skin:     General: Skin is warm.      Comments: There is no bruising, no erythema, no skin lesions noted, no rashes.   Neurological:      Mental Status: She is alert and oriented to person, place, and time. Mental status is at baseline.      Comments:  Patient has peripheral neuropathy in the lower legs and feet.  Occasionally has to use a cane due to her lumbar compression fractures and back pain.  Coordination and gait are stable.  Normal muscle strength upper and lower extremities.   Psychiatric:         Behavior: Behavior normal.         Thought Content: Thought content normal.         Judgment: Judgment normal.      Comments: Patient has flat mood and affect.  Thought content and judgment are stable.  No signs of vascular dementia.  Behavior is normal.     PLAN : Patient is an 82-year-old female who was evaluated for follow-up on her L1 and L4 compression fractures.  We did review the recent  x-rays and her pain is improving and she occasionally wears a back brace.  Her blood sugar today was 124 and her INR was 1.8%.  She will take an extra half of Coumadin today and follow-up in 6 weeks.  Patient states her back problem is slowly improving and she usually does wear a back brace for stabilization of the compression fractures.  She did not want to go for any therapy at this time and will follow-up and further recommendations will be made at that time.    Problem List Items Addressed This Visit       Chronic low back pain    Hyperglycemia    Relevant Orders    POCT fingerstick glucose manually resulted (Completed)    Lumbosacral disc disease    Pre-diabetes    Pulmonary embolus (CMS/HCC)    Visual disturbance - Primary    Overactive bladder    Osteoporosis     Other Visit Diagnoses       Anticoagulated on Coumadin        Relevant Orders    POCT INR manually resulted (Completed)    Lumbar compression fracture, with routine healing, subsequent encounter                     David Granados, DO

## 2023-10-17 ENCOUNTER — OFFICE VISIT (OUTPATIENT)
Dept: PRIMARY CARE | Facility: CLINIC | Age: 83
End: 2023-10-17
Payer: MEDICARE

## 2023-10-17 VITALS
DIASTOLIC BLOOD PRESSURE: 73 MMHG | WEIGHT: 164 LBS | BODY MASS INDEX: 25.74 KG/M2 | HEIGHT: 67 IN | RESPIRATION RATE: 16 BRPM | HEART RATE: 60 BPM | SYSTOLIC BLOOD PRESSURE: 124 MMHG | TEMPERATURE: 97.7 F | OXYGEN SATURATION: 96 %

## 2023-10-17 DIAGNOSIS — N32.81 OVERACTIVE BLADDER: ICD-10-CM

## 2023-10-17 DIAGNOSIS — M85.88 OSTEOPENIA OF LUMBAR SPINE: ICD-10-CM

## 2023-10-17 DIAGNOSIS — H53.9 VISUAL DISTURBANCE: Primary | ICD-10-CM

## 2023-10-17 DIAGNOSIS — M47.25 OSTEOARTHRITIS OF SPINE WITH RADICULOPATHY, THORACOLUMBAR REGION: ICD-10-CM

## 2023-10-17 DIAGNOSIS — R26.81 UNSTEADY GAIT: ICD-10-CM

## 2023-10-17 DIAGNOSIS — G89.29 CHRONIC MIDLINE LOW BACK PAIN WITHOUT SCIATICA: ICD-10-CM

## 2023-10-17 DIAGNOSIS — I82.5Z3 CHRONIC DEEP VEIN THROMBOSIS (DVT) OF DISTAL VEIN OF BOTH LOWER EXTREMITIES (MULTI): ICD-10-CM

## 2023-10-17 DIAGNOSIS — R73.9 HYPERGLYCEMIA: ICD-10-CM

## 2023-10-17 DIAGNOSIS — I27.82 CHRONIC PULMONARY EMBOLISM WITHOUT ACUTE COR PULMONALE, UNSPECIFIED PULMONARY EMBOLISM TYPE (MULTI): ICD-10-CM

## 2023-10-17 DIAGNOSIS — R73.03 PRE-DIABETES: ICD-10-CM

## 2023-10-17 DIAGNOSIS — Z79.01 ANTICOAGULATED ON COUMADIN: ICD-10-CM

## 2023-10-17 DIAGNOSIS — M54.50 CHRONIC MIDLINE LOW BACK PAIN WITHOUT SCIATICA: ICD-10-CM

## 2023-10-17 LAB
POC FINGERSTICK BLOOD GLUCOSE: 120 MG/DL (ref 70–100)
POC INR: 1.9 (ref 0.9–1.1)

## 2023-10-17 PROCEDURE — 1160F RVW MEDS BY RX/DR IN RCRD: CPT | Performed by: FAMILY MEDICINE

## 2023-10-17 PROCEDURE — 1159F MED LIST DOCD IN RCRD: CPT | Performed by: FAMILY MEDICINE

## 2023-10-17 PROCEDURE — 85610 PROTHROMBIN TIME: CPT | Performed by: FAMILY MEDICINE

## 2023-10-17 PROCEDURE — 1036F TOBACCO NON-USER: CPT | Performed by: FAMILY MEDICINE

## 2023-10-17 PROCEDURE — 82962 GLUCOSE BLOOD TEST: CPT | Performed by: FAMILY MEDICINE

## 2023-10-17 PROCEDURE — 1126F AMNT PAIN NOTED NONE PRSNT: CPT | Performed by: FAMILY MEDICINE

## 2023-10-17 PROCEDURE — 3078F DIAST BP <80 MM HG: CPT | Performed by: FAMILY MEDICINE

## 2023-10-17 PROCEDURE — 3074F SYST BP LT 130 MM HG: CPT | Performed by: FAMILY MEDICINE

## 2023-10-17 PROCEDURE — 99214 OFFICE O/P EST MOD 30 MIN: CPT | Performed by: FAMILY MEDICINE

## 2023-10-17 ASSESSMENT — PATIENT HEALTH QUESTIONNAIRE - PHQ9
2. FEELING DOWN, DEPRESSED OR HOPELESS: NOT AT ALL
SUM OF ALL RESPONSES TO PHQ9 QUESTIONS 1 AND 2: 0
1. LITTLE INTEREST OR PLEASURE IN DOING THINGS: NOT AT ALL

## 2023-10-17 ASSESSMENT — PAIN SCALES - GENERAL: PAINLEVEL: 0-NO PAIN

## 2023-10-17 NOTE — PROGRESS NOTES
Subjective   Janice Avalos is a 83 y.o. female who presents for Follow-up (Follow up visit for Coumadin check. Patient complains of foot fungus today).     HPI : Patient is a 83-year-old female who is in for recheck on blood sugar and INR.  She has been on Coumadin for many years due to blood clots and we are rechecking her INR today.  She has had a little touch of borderline diabetes but she is watching her diet better and her sugar has improved.  She also has not a new injection for macular degeneration of her eyes and she states her eyesight has improved with the new injection.  Patient also is concerned about a toenail fungus which I will further evaluate.  She has seen podiatry in the past to have her toenails cut.      Objective  : ROS : 10 systems were reviewed and the information is included in the HPI and no additional review of systems is indicated.    Physical Exam  Vitals and nursing note reviewed.   Constitutional:       Appearance: Normal appearance.      Comments: Patient is alert and oriented x3.   No acute distress and she states her vision has improved with the new injections that she is receiving.   HENT:      Head: Normocephalic.      Right Ear: Tympanic membrane and external ear normal.      Left Ear: Tympanic membrane and external ear normal.      Ears:      Comments: Ears are patent bilaterally and TMs are clear.     Nose: Nose normal.      Mouth/Throat:      Mouth: Mucous membranes are moist.      Pharynx: Oropharynx is clear.      Comments: Mouth is moist, tongue is midline.  No posterior pharyngeal erythema.  Eyes:      Extraocular Movements: Extraocular movements intact.      Conjunctiva/sclera: Conjunctivae normal.      Pupils: Pupils are equal, round, and reactive to light.      Comments: Macular degeneration and follows with Ret[facility] , Dr Garber   Neck:      Comments: No carotid bruits, no thyromegaly, no cervical adenopathy.  Occasional neck spasm and restriction of motion  secondary to stress and tension.  Cardiovascular:      Rate and Rhythm: Normal rate and regular rhythm.      Pulses: Normal pulses.      Heart sounds: Normal heart sounds.      Comments: Patient denies chest pain and no palpitations.  Heart rhythm is stable S1 and S2 are noted, no ectopics.  No ankle edema but has had previous DVTs.  Pulmonary:      Effort: Pulmonary effort is normal.      Breath sounds: Normal breath sounds.      Comments: Patient denies any coughing or wheezing.  Lungs are clear to auscultation.    History of pulmonary emboli but currently stable.  Abdominal:      General: Bowel sounds are normal.      Palpations: Abdomen is soft.      Comments: Abdomen is soft and nontender, no hepatosplenomegaly.  No flank tenderness.  No suprapubic pain.  Positive bowel sounds x4.  Occasional constipation.   Genitourinary:     Comments: Patient denies dysuria, no hematuria,denies flank pain.  Patient does have occasional overactive bladder and nocturia.  Musculoskeletal:         General: Tenderness present.      Cervical back: Normal range of motion.      Comments: Right knee arthritis.  Lower back pains from compression fracture.    Age-related arthritis in the joints.  Mild restriction of motion cervical and lumbar spines due to  arthritis and  muscle spasm.   Saw podiatry and needs  toe nail cutting.   Skin:     General: Skin is warm.      Findings: Bruising present.      Comments: Patient is concerned about thickening of her toenails and also toenail fungus.  She has seen podiatry in the past for cutting of the toenails and she will make a follow-up visit.  She can also try some Lotrimin over-the-counter cream.  Patient does bruise easily due to her treatment with Coumadin.   Neurological:      Mental Status: She is alert and oriented to person, place, and time. Mental status is at baseline.      Comments: Occasional carpal tunnel numbness and tingling in both of her arms.  She does not want any surgery.   Patient denies any peripheral neuropathy.  Coordination and gait are stable.  Normal muscle strength upper and lower extremities.   Psychiatric:         Mood and Affect: Mood normal.         Behavior: Behavior normal.         Thought Content: Thought content normal.         Judgment: Judgment normal.      Comments: Patient is very talkative and has a normal mood and affect.   Thought content and judgment are stable.  No signs of vascular dementia.  Behavior is normal.     PLAN : Patient is an 83-year-old female who has several compression fractures in her spine and a history of DVTs and pulmonary emboli.  She has been on Coumadin for many years and today her INR is 1.9%.  Her blood sugar was stable at 120.  Patient states her vision has improved with a new injection she is receiving from ophthalmology and retinal specialist.  She is ambulating better and she has moderate pain from the chronic lumbar compression fracture.  She does wear back brace when she does any strenuous work or bending or lifting or cleaning at home.  She otherwise is stable we will follow-up in 3 months or sooner as needed.    Problem List Items Addressed This Visit       Hyperglycemia    Relevant Orders    POCT fingerstick glucose manually resulted (Completed)    Anticoagulated on Coumadin    Relevant Orders    POCT INR manually resulted (Completed)            David Granados,

## 2024-01-17 ENCOUNTER — OFFICE VISIT (OUTPATIENT)
Dept: PRIMARY CARE | Facility: CLINIC | Age: 84
End: 2024-01-17
Payer: MEDICARE

## 2024-01-17 VITALS
DIASTOLIC BLOOD PRESSURE: 84 MMHG | WEIGHT: 164 LBS | HEART RATE: 60 BPM | OXYGEN SATURATION: 95 % | HEIGHT: 67 IN | SYSTOLIC BLOOD PRESSURE: 142 MMHG | TEMPERATURE: 97.6 F | BODY MASS INDEX: 25.74 KG/M2 | RESPIRATION RATE: 16 BRPM

## 2024-01-17 DIAGNOSIS — E11.9 DIABETES MELLITUS WITHOUT COMPLICATION (MULTI): ICD-10-CM

## 2024-01-17 DIAGNOSIS — S32.010A CLOSED COMPRESSION FRACTURE OF BODY OF L1 VERTEBRA (MULTI): ICD-10-CM

## 2024-01-17 DIAGNOSIS — E55.9 VITAMIN D DEFICIENCY: ICD-10-CM

## 2024-01-17 DIAGNOSIS — Z86.718 HISTORY OF DVT (DEEP VEIN THROMBOSIS): ICD-10-CM

## 2024-01-17 DIAGNOSIS — I10 HYPERTENSION, UNSPECIFIED TYPE: ICD-10-CM

## 2024-01-17 DIAGNOSIS — H34.8322 TRIBUTARY (BRANCH) RETINAL VEIN OCCLUSION, LEFT EYE, STABLE (CMS-HCC): ICD-10-CM

## 2024-01-17 DIAGNOSIS — Z00.00 ROUTINE GENERAL MEDICAL EXAMINATION AT HEALTH CARE FACILITY: Primary | ICD-10-CM

## 2024-01-17 DIAGNOSIS — I10 PRIMARY HYPERTENSION: ICD-10-CM

## 2024-01-17 DIAGNOSIS — E04.1 NODULE OF LEFT LOBE OF THYROID GLAND: ICD-10-CM

## 2024-01-17 DIAGNOSIS — G61.9 INFLAMMATORY NEUROPATHY (MULTI): ICD-10-CM

## 2024-01-17 DIAGNOSIS — Z79.01 ANTICOAGULATED ON COUMADIN: ICD-10-CM

## 2024-01-17 DIAGNOSIS — R73.03 PRE-DIABETES: ICD-10-CM

## 2024-01-17 DIAGNOSIS — I27.82 CHRONIC PULMONARY EMBOLISM WITHOUT ACUTE COR PULMONALE, UNSPECIFIED PULMONARY EMBOLISM TYPE (MULTI): ICD-10-CM

## 2024-01-17 DIAGNOSIS — H35.3231 EXUDATIVE AGE-RELATED MACULAR DEGENERATION, BILATERAL, WITH ACTIVE CHOROIDAL NEOVASCULARIZATION (MULTI): ICD-10-CM

## 2024-01-17 DIAGNOSIS — F41.9 ANXIETY: ICD-10-CM

## 2024-01-17 DIAGNOSIS — R73.9 HYPERGLYCEMIA: ICD-10-CM

## 2024-01-17 DIAGNOSIS — S32.040S CLOSED COMPRESSION FRACTURE OF L4 VERTEBRA, SEQUELA: ICD-10-CM

## 2024-01-17 LAB
25(OH)D3 SERPL-MCNC: 37 NG/ML (ref 30–100)
ALBUMIN SERPL BCP-MCNC: 4 G/DL (ref 3.4–5)
ALP SERPL-CCNC: 55 U/L (ref 33–136)
ALT SERPL W P-5'-P-CCNC: 18 U/L (ref 7–45)
ANION GAP SERPL CALC-SCNC: 13 MMOL/L (ref 10–20)
AST SERPL W P-5'-P-CCNC: 15 U/L (ref 9–39)
BILIRUB SERPL-MCNC: 0.6 MG/DL (ref 0–1.2)
BUN SERPL-MCNC: 12 MG/DL (ref 6–23)
CALCIUM SERPL-MCNC: 9.2 MG/DL (ref 8.6–10.6)
CHLORIDE SERPL-SCNC: 106 MMOL/L (ref 98–107)
CHOLEST SERPL-MCNC: 216 MG/DL (ref 0–199)
CHOLESTEROL/HDL RATIO: 3.3
CO2 SERPL-SCNC: 26 MMOL/L (ref 21–32)
CREAT SERPL-MCNC: 0.59 MG/DL (ref 0.5–1.05)
EGFRCR SERPLBLD CKD-EPI 2021: 90 ML/MIN/1.73M*2
ERYTHROCYTE [DISTWIDTH] IN BLOOD BY AUTOMATED COUNT: 15.2 % (ref 11.5–14.5)
GLUCOSE SERPL-MCNC: 87 MG/DL (ref 74–99)
HCT VFR BLD AUTO: 40.3 % (ref 36–46)
HDLC SERPL-MCNC: 64.9 MG/DL
HGB BLD-MCNC: 12.9 G/DL (ref 12–16)
LDLC SERPL CALC-MCNC: 134 MG/DL
MCH RBC QN AUTO: 30.4 PG (ref 26–34)
MCHC RBC AUTO-ENTMCNC: 32 G/DL (ref 32–36)
MCV RBC AUTO: 95 FL (ref 80–100)
NON HDL CHOLESTEROL: 151 MG/DL (ref 0–149)
NRBC BLD-RTO: 0 /100 WBCS (ref 0–0)
PLATELET # BLD AUTO: 240 X10*3/UL (ref 150–450)
POC FINGERSTICK BLOOD GLUCOSE: 101 MG/DL (ref 70–100)
POC INR: 1.5 (ref 0.9–1.1)
POTASSIUM SERPL-SCNC: 4.3 MMOL/L (ref 3.5–5.3)
PROT SERPL-MCNC: 6.9 G/DL (ref 6.4–8.2)
RBC # BLD AUTO: 4.25 X10*6/UL (ref 4–5.2)
SODIUM SERPL-SCNC: 141 MMOL/L (ref 136–145)
TRIGL SERPL-MCNC: 85 MG/DL (ref 0–149)
TSH SERPL-ACNC: 1.99 MIU/L (ref 0.44–3.98)
VLDL: 17 MG/DL (ref 0–40)
WBC # BLD AUTO: 5.1 X10*3/UL (ref 4.4–11.3)

## 2024-01-17 PROCEDURE — G0439 PPPS, SUBSEQ VISIT: HCPCS | Performed by: FAMILY MEDICINE

## 2024-01-17 PROCEDURE — 1160F RVW MEDS BY RX/DR IN RCRD: CPT | Performed by: FAMILY MEDICINE

## 2024-01-17 PROCEDURE — 1036F TOBACCO NON-USER: CPT | Performed by: FAMILY MEDICINE

## 2024-01-17 PROCEDURE — 82962 GLUCOSE BLOOD TEST: CPT | Performed by: FAMILY MEDICINE

## 2024-01-17 PROCEDURE — 80053 COMPREHEN METABOLIC PANEL: CPT

## 2024-01-17 PROCEDURE — 99214 OFFICE O/P EST MOD 30 MIN: CPT | Performed by: FAMILY MEDICINE

## 2024-01-17 PROCEDURE — 1159F MED LIST DOCD IN RCRD: CPT | Performed by: FAMILY MEDICINE

## 2024-01-17 PROCEDURE — 3077F SYST BP >= 140 MM HG: CPT | Performed by: FAMILY MEDICINE

## 2024-01-17 PROCEDURE — 84443 ASSAY THYROID STIM HORMONE: CPT

## 2024-01-17 PROCEDURE — 3079F DIAST BP 80-89 MM HG: CPT | Performed by: FAMILY MEDICINE

## 2024-01-17 PROCEDURE — 1170F FXNL STATUS ASSESSED: CPT | Performed by: FAMILY MEDICINE

## 2024-01-17 PROCEDURE — 85027 COMPLETE CBC AUTOMATED: CPT

## 2024-01-17 PROCEDURE — 85610 PROTHROMBIN TIME: CPT | Performed by: FAMILY MEDICINE

## 2024-01-17 PROCEDURE — 1126F AMNT PAIN NOTED NONE PRSNT: CPT | Performed by: FAMILY MEDICINE

## 2024-01-17 PROCEDURE — 36415 COLL VENOUS BLD VENIPUNCTURE: CPT

## 2024-01-17 PROCEDURE — 82306 VITAMIN D 25 HYDROXY: CPT

## 2024-01-17 PROCEDURE — 80061 LIPID PANEL: CPT

## 2024-01-17 RX ORDER — WARFARIN SODIUM 5 MG/1
5 TABLET ORAL SEE ADMIN INSTRUCTIONS
COMMUNITY
End: 2024-04-11 | Stop reason: SDUPTHER

## 2024-01-17 ASSESSMENT — PATIENT HEALTH QUESTIONNAIRE - PHQ9
1. LITTLE INTEREST OR PLEASURE IN DOING THINGS: NOT AT ALL
SUM OF ALL RESPONSES TO PHQ9 QUESTIONS 1 AND 2: 0
2. FEELING DOWN, DEPRESSED OR HOPELESS: NOT AT ALL

## 2024-01-17 ASSESSMENT — ACTIVITIES OF DAILY LIVING (ADL)
MANAGING_FINANCES: INDEPENDENT
DRESSING: INDEPENDENT
DOING_HOUSEWORK: INDEPENDENT
GROCERY_SHOPPING: INDEPENDENT
TAKING_MEDICATION: INDEPENDENT
BATHING: INDEPENDENT

## 2024-01-17 ASSESSMENT — PAIN SCALES - GENERAL: PAINLEVEL: 0-NO PAIN

## 2024-01-17 ASSESSMENT — ENCOUNTER SYMPTOMS
OCCASIONAL FEELINGS OF UNSTEADINESS: 0
DEPRESSION: 0
LOSS OF SENSATION IN FEET: 0

## 2024-01-17 NOTE — PATIENT INSTRUCTIONS

## 2024-01-17 NOTE — PROGRESS NOTES
"Subjective   Reason for Visit: Janice Avalos is an 83 y.o. female here for a Medicare Wellness visit.     Past Medical, Surgical, and Family History reviewed and updated in chart.    Reviewed all medications by prescribing practitioner or clinical pharmacist (such as prescriptions, OTCs, herbal therapies and supplements) and documented in the medical record.    HPI  Medicare wellness exam.  Patient is an 83-year-old female who is being evaluated today for follow-up visit and recheck on blood work along with a Medicare wellness exam.  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  She also has history of compression fractures of L1-L4 and her low back.  That has done quite well and she does wear a back brace as needed.  She also recently had an ultrasound of her thyroid due to thyroid enlargement and they did recommend fine-needle biopsy.  She will be referred to ENT for that procedure.  Patient also will complete the Medicare questions as presented by the medical assistant.  She does have a living will and medical power of .  She currently is ambulating better since her previous compression fractures and she does not need a walker but does occasionally use a cane.    Patient Care Team:  David Granados DO as PCP - General  David Granados DO as PCP - [employer] PCP     Review of Systems  : 10 systems were reviewed and the information is included in the HPI and no additional review of systems is indicated.    Objective   Vitals:  /84   Pulse 60   Temp 36.4 °C (97.6 °F)   Resp 16   Ht 1.702 m (5' 7\")   Wt 74.4 kg (164 lb)   SpO2 95%   BMI 25.69 kg/m²       Physical Exam  Vitals and nursing note reviewed.   Constitutional:       Appearance: Normal appearance.      Comments: Patient is alert and oriented x3.   No acute distress   HENT:      Head: Normocephalic.      Right Ear: Tympanic membrane and external ear normal.      Left Ear: Tympanic membrane and external " ear normal.      Ears:      Comments: Ears are patent bilaterally and TMs are clear.     Nose: Nose normal.      Mouth/Throat:      Mouth: Mucous membranes are moist.      Pharynx: Oropharynx is clear.      Comments: Mouth is moist, tongue is midline.  No posterior pharyngeal erythema.  Eyes:      Extraocular Movements: Extraocular movements intact.      Conjunctiva/sclera: Conjunctivae normal.      Pupils: Pupils are equal, round, and reactive to light.      Comments: No visual disturbance, does have her eyes examined once a year.   Neck:      Comments: Patient has a large palpable left lobe of the thyroid gland that needs fine-needle aspiration biopsy.  I did review the recent ultrasound with the patient and she was referred to ENT.  This occasionally does bother her with swallowing and breathing.  No carotid bruits.   Neck spasm and restriction of motion secondary to arthritis, stress and tension.  Cardiovascular:      Rate and Rhythm: Normal rate and regular rhythm.      Pulses: Normal pulses.      Heart sounds: Normal heart sounds.      Comments: Patient denies chest pain and no palpitations.  Heart rhythm is stable S1 and S2 are noted, no ectopics.  Pulmonary:      Effort: Pulmonary effort is normal.      Comments: Patient denies any coughing or wheezing.  Lungs are clear to auscultation.    Abdominal:      General: Bowel sounds are normal.      Palpations: Abdomen is soft.      Comments: Abdomen is soft and nontender, no hepatosplenomegaly.  No flank tenderness.  No suprapubic pain.  Positive bowel sounds x4.  No abdominal guarding and no rebound tenderness.   Genitourinary:     Comments: Patient does have overactive bladder problems.  She has seen urology in the past.  Patient denies dysuria, no hematuria, no nocturia, denies flank pain.  Musculoskeletal:         General: Tenderness present.      Comments: History of L1-L4 compression fractures.  She does occasionally wear her back brace for support.  The  pain has improved and she is ambulating better without the use of a walker.  He also has arthritis in the hips and knees.  No ankle edema was noted but she does wear support stockings.   Skin:     General: Skin is warm and dry.      Comments: Patient does have dry skin and does breakout with eczematous dermatitis.  She also bruises from her Coumadin.   Neurological:      Mental Status: She is alert and oriented to person, place, and time. Mental status is at baseline.      Sensory: Sensory deficit present.      Coordination: Coordination abnormal.      Deep Tendon Reflexes: Reflexes abnormal.      Comments: Patient is at baseline and does have some paresthesias and occasionally sciatica on lower extremities from her lower back.  She has no focal neurologic deficits.  Coordination is somewhat restricted due to her lower back problems and history of compression fractures.  Currently her gait is stable but she does have some weakness in her legs.  Normal strength in the upper extremities and diminished muscle strength in the lower extremities.   Psychiatric:         Mood and Affect: Mood normal.         Behavior: Behavior normal.         Thought Content: Thought content normal.         Judgment: Judgment normal.      Comments: Patient has normal mood and affect.  Thought content and judgment are stable.  No signs of vascular dementia.  Behavior is normal.     PLAN : Patient is an 83-year-old female who was evaluated for a Medicare wellness exam.  She did complete the Medicare questions as presented by the medical assistant.  She does have a living will and medical power of .  Blood sugar today was 101 and her Coumadin INR was thick at 1.5%.  She will take an extra Coumadin today and then resume her normal dosage schedule.  She takes 2.5 mg on Tuesday and Thursday and 5 mg on the other days.  She will return in 4 weeks to have her INR rechecked.  She also will be notified of her blood results in 4 days and  further recommendations will be made at that time.  She was referred to ENT to have a needle biopsy on her enlarged left thyroid gland.  We did review the ultrasound report and they did recommend needle aspiration.  Patient otherwise is stable and will follow-up in 4 weeks for recheck on her Coumadin INR.          Assessment/Plan   Problem List Items Addressed This Visit       Anxiety    Relevant Orders    CBC    Comprehensive Metabolic Panel    Lipid Panel    Thyroid Stimulating Hormone    History of DVT (deep vein thrombosis)    Relevant Orders    CBC    Comprehensive Metabolic Panel    Lipid Panel    POCT INR manually resulted    Thyroid Stimulating Hormone    HTN (hypertension)    Relevant Orders    CBC    Comprehensive Metabolic Panel    Lipid Panel    POCT INR manually resulted    Thyroid Stimulating Hormone    CBC    Comprehensive Metabolic Panel    Lipid Panel    Thyroid Stimulating Hormone    Hyperglycemia    Relevant Orders    POCT fingerstick glucose manually resulted    Pre-diabetes    Relevant Orders    POCT fingerstick glucose manually resulted    Pulmonary embolus (CMS/HCC)    Relevant Orders    POCT INR manually resulted    Vitamin D deficiency    Relevant Orders    Vitamin D 25-Hydroxy,Total (for eval of Vitamin D levels)    Anticoagulated on Coumadin    Relevant Orders    POCT INR manually resulted     Other Visit Diagnoses       Routine general medical examination at health care facility    -  Primary    Diabetes mellitus without complication (CMS/HCC)        Relevant Orders    POCT fingerstick glucose manually resulted    POCT Glycosylated Hemoglobin (HGB A1C) docked device             Patient was identified as a fall risk. Risk prevention instructions provided.

## 2024-01-20 NOTE — RESULT ENCOUNTER NOTE
Cholesterol was borderline at 216    should be under 200   triglycerides are normal at 85     Red and white blood cell counts are normal   platelet count is normal    thyroid function is normal vitamin D is normal at 37     kidney and liver function are normal    blood sugar is normal    just try to get the cholesterol under 200        all the other blood work is stable

## 2024-01-31 DIAGNOSIS — Z86.718 HISTORY OF DVT (DEEP VEIN THROMBOSIS): Primary | ICD-10-CM

## 2024-01-31 RX ORDER — WARFARIN SODIUM 5 MG/1
TABLET ORAL
Qty: 52 TABLET | Refills: 3 | Status: SHIPPED | OUTPATIENT
Start: 2024-01-31

## 2024-02-08 ENCOUNTER — APPOINTMENT (OUTPATIENT)
Dept: UROLOGY | Facility: CLINIC | Age: 84
End: 2024-02-08
Payer: MEDICARE

## 2024-02-15 ENCOUNTER — OFFICE VISIT (OUTPATIENT)
Dept: PRIMARY CARE | Facility: CLINIC | Age: 84
End: 2024-02-15
Payer: MEDICARE

## 2024-02-15 VITALS
OXYGEN SATURATION: 94 % | RESPIRATION RATE: 16 BRPM | TEMPERATURE: 97.7 F | WEIGHT: 165 LBS | DIASTOLIC BLOOD PRESSURE: 84 MMHG | HEART RATE: 59 BPM | BODY MASS INDEX: 25.9 KG/M2 | SYSTOLIC BLOOD PRESSURE: 138 MMHG | HEIGHT: 67 IN

## 2024-02-15 DIAGNOSIS — I49.8 PERIODIC HEART FLUTTER: ICD-10-CM

## 2024-02-15 DIAGNOSIS — I10 PRIMARY HYPERTENSION: ICD-10-CM

## 2024-02-15 DIAGNOSIS — S32.010A CLOSED COMPRESSION FRACTURE OF BODY OF L1 VERTEBRA (MULTI): ICD-10-CM

## 2024-02-15 DIAGNOSIS — M47.25 OSTEOARTHRITIS OF SPINE WITH RADICULOPATHY, THORACOLUMBAR REGION: ICD-10-CM

## 2024-02-15 DIAGNOSIS — F41.9 ANXIETY: Primary | ICD-10-CM

## 2024-02-15 DIAGNOSIS — Z79.01 ANTICOAGULATED ON COUMADIN: ICD-10-CM

## 2024-02-15 DIAGNOSIS — R73.9 HYPERGLYCEMIA: ICD-10-CM

## 2024-02-15 LAB
POC FINGERSTICK BLOOD GLUCOSE: 118 MG/DL (ref 70–100)
POC INR: 2.4 (ref 0.9–1.1)

## 2024-02-15 PROCEDURE — 1036F TOBACCO NON-USER: CPT | Performed by: FAMILY MEDICINE

## 2024-02-15 PROCEDURE — 1159F MED LIST DOCD IN RCRD: CPT | Performed by: FAMILY MEDICINE

## 2024-02-15 PROCEDURE — 99214 OFFICE O/P EST MOD 30 MIN: CPT | Performed by: FAMILY MEDICINE

## 2024-02-15 PROCEDURE — 3075F SYST BP GE 130 - 139MM HG: CPT | Performed by: FAMILY MEDICINE

## 2024-02-15 PROCEDURE — 1160F RVW MEDS BY RX/DR IN RCRD: CPT | Performed by: FAMILY MEDICINE

## 2024-02-15 PROCEDURE — 1126F AMNT PAIN NOTED NONE PRSNT: CPT | Performed by: FAMILY MEDICINE

## 2024-02-15 PROCEDURE — 3079F DIAST BP 80-89 MM HG: CPT | Performed by: FAMILY MEDICINE

## 2024-02-15 PROCEDURE — 82962 GLUCOSE BLOOD TEST: CPT | Performed by: FAMILY MEDICINE

## 2024-02-15 PROCEDURE — 1157F ADVNC CARE PLAN IN RCRD: CPT | Performed by: FAMILY MEDICINE

## 2024-02-15 PROCEDURE — 93000 ELECTROCARDIOGRAM COMPLETE: CPT | Performed by: FAMILY MEDICINE

## 2024-02-15 PROCEDURE — 85610 PROTHROMBIN TIME: CPT | Performed by: FAMILY MEDICINE

## 2024-02-15 ASSESSMENT — PAIN SCALES - GENERAL: PAINLEVEL: 0-NO PAIN

## 2024-02-15 NOTE — PROGRESS NOTES
Subjective   Janice Avalos is a 83 y.o. female who presents for Follow-up (Follow up visit today).    HPI  : Patient is a 83-year-old elderly female who was evaluated today for some heart palpitations and fluttering.  She did not really have chest pain but was worried that she had an irregular heartbeat.  ECG will be done today and we will also check her sugar and her COVID level.  She has a history of DVTs and pulmonary emboli and is on Coumadin.  She also states that she went to the [facility] a few weeks ago due to shingles on the top of her scalp.  She was given acyclovir and that has since resolved.    Objective   : ROS :10 systems were reviewed and the information is included in the HPI and no additional review of systems is indicated.    Physical Exam  Vitals and nursing note reviewed.   Constitutional:       Appearance: Normal appearance.      Comments: Patient is alert and oriented x3.   No acute distress   HENT:      Head: Normocephalic.      Right Ear: Tympanic membrane and external ear normal.      Left Ear: Tympanic membrane and external ear normal.      Ears:      Comments: Ears are patent bilaterally and TMs are clear.     Nose: Nose normal.      Mouth/Throat:      Mouth: Mucous membranes are moist.      Pharynx: Oropharynx is clear.      Comments: Mouth is moist, tongue is midline.  No posterior pharyngeal erythema.  Eyes:      Extraocular Movements: Extraocular movements intact.      Conjunctiva/sclera: Conjunctivae normal.      Pupils: Pupils are equal, round, and reactive to light.      Comments: Follows  with retinal specialist.   Neck:      Comments: Left thyroid selling.  Has appointment with ENT.   No carotid bruits, no thyromegaly, no cervical adenopathy.  Neck spasm and restriction of motion secondary to arthritis, stress and tension.  Cardiovascular:      Rate and Rhythm: Regular rhythm. Bradycardia present.      Pulses: Normal pulses.      Heart sounds: Normal heart sounds.      Comments:  Patient had several heart flutters.  Anxiety and stressed.    Heart rhythm is stable S1 and S2 are noted.  Pulmonary:      Effort: Pulmonary effort is normal.      Comments: Patient denies any coughing or wheezing.  Lungs are clear to auscultation.    Abdominal:      General: Bowel sounds are normal.      Palpations: Abdomen is soft.      Comments: Abdomen is soft and nontender, no hepatosplenomegaly.  No flank tenderness.  No suprapubic pain.  Positive bowel sounds x4.  No abdominal guarding and no rebound tenderness.   Genitourinary:     Comments: Patient denies dysuria, no hematuria,  denies flank pain.  Nocturia.    Musculoskeletal:         General: Tenderness present.      Comments: Arthritis in her back and hips.  Age-related arthritis in the joints.  Restriction of motion cervical and lumbar spines due to arthritis , and muscle spasm.  History of compressed L 1  lumbar vertebrae. Chronic back pains.    Skin:     General: Skin is warm.      Comments: Went to [facility] for possible shingles on right scalp.   Resolved after taking Acyclovir.  Cold fingers.    Neurological:      General: No focal deficit present.      Mental Status: She is alert and oriented to person, place, and time. Mental status is at baseline.      Sensory: Sensory deficit present.      Comments:   Paresthesias  lower extremities.   No focal neurosensory deficits are noted.   Coordination is limited due to back pains.    Psychiatric:         Mood and Affect: Mood normal.         Behavior: Behavior normal.         Thought Content: Thought content normal.         Judgment: Judgment normal.      Comments: Patient has normal mood and affect.  Thought content and judgment are stable.  No signs of vascular dementia.  Behavior is normal.   PLAN : Patient is an 83-year-old female who was evaluated today for some heart flutters and possible irregular palpitations.  ECG in the office today shows sinus bradycardia at a rate of 59, tall QRS complexes  but no ST-T wave changes.  No ectopics were noted.  She did not have any chest pain and her blood pressure is stable.  She does have increased anxiety and recently had a bout of shingles on her scalp.  She had been evaluated and treated at the [facility] and was given acyclovir.  Today her blood sugar was stable at 118 and her INR was stable at 2.4%.  She otherwise will return in 2 months or sooner as needed and I told her to monitor her pulse and her heart rate.  She does occasionally check her blood pressure as well at home.  I told her if she has any chest pain to go right to the emergency room.    Problem List Items Addressed This Visit    None           David Granados, DO

## 2024-03-21 ENCOUNTER — OFFICE VISIT (OUTPATIENT)
Dept: OTOLARYNGOLOGY | Facility: CLINIC | Age: 84
End: 2024-03-21
Payer: MEDICARE

## 2024-03-21 VITALS
RESPIRATION RATE: 16 BRPM | WEIGHT: 160 LBS | TEMPERATURE: 98.2 F | HEART RATE: 66 BPM | DIASTOLIC BLOOD PRESSURE: 82 MMHG | OXYGEN SATURATION: 95 % | HEIGHT: 67 IN | SYSTOLIC BLOOD PRESSURE: 135 MMHG | BODY MASS INDEX: 25.11 KG/M2

## 2024-03-21 DIAGNOSIS — R13.10 DYSPHAGIA, UNSPECIFIED TYPE: ICD-10-CM

## 2024-03-21 DIAGNOSIS — J38.7 PRESBYLARYNX: ICD-10-CM

## 2024-03-21 DIAGNOSIS — E04.1 NODULE OF LEFT LOBE OF THYROID GLAND: Primary | ICD-10-CM

## 2024-03-21 DIAGNOSIS — R49.0 DYSPHONIA: ICD-10-CM

## 2024-03-21 PROCEDURE — 99215 OFFICE O/P EST HI 40 MIN: CPT | Performed by: STUDENT IN AN ORGANIZED HEALTH CARE EDUCATION/TRAINING PROGRAM

## 2024-03-21 PROCEDURE — 3075F SYST BP GE 130 - 139MM HG: CPT | Performed by: STUDENT IN AN ORGANIZED HEALTH CARE EDUCATION/TRAINING PROGRAM

## 2024-03-21 PROCEDURE — 1036F TOBACCO NON-USER: CPT | Performed by: STUDENT IN AN ORGANIZED HEALTH CARE EDUCATION/TRAINING PROGRAM

## 2024-03-21 PROCEDURE — 31575 DIAGNOSTIC LARYNGOSCOPY: CPT | Performed by: STUDENT IN AN ORGANIZED HEALTH CARE EDUCATION/TRAINING PROGRAM

## 2024-03-21 PROCEDURE — 1159F MED LIST DOCD IN RCRD: CPT | Performed by: STUDENT IN AN ORGANIZED HEALTH CARE EDUCATION/TRAINING PROGRAM

## 2024-03-21 PROCEDURE — 1157F ADVNC CARE PLAN IN RCRD: CPT | Performed by: STUDENT IN AN ORGANIZED HEALTH CARE EDUCATION/TRAINING PROGRAM

## 2024-03-21 PROCEDURE — 1160F RVW MEDS BY RX/DR IN RCRD: CPT | Performed by: STUDENT IN AN ORGANIZED HEALTH CARE EDUCATION/TRAINING PROGRAM

## 2024-03-21 PROCEDURE — 1126F AMNT PAIN NOTED NONE PRSNT: CPT | Performed by: STUDENT IN AN ORGANIZED HEALTH CARE EDUCATION/TRAINING PROGRAM

## 2024-03-21 PROCEDURE — 3079F DIAST BP 80-89 MM HG: CPT | Performed by: STUDENT IN AN ORGANIZED HEALTH CARE EDUCATION/TRAINING PROGRAM

## 2024-03-21 RX ORDER — PREDNISOLONE ACETATE 10 MG/ML
SUSPENSION/ DROPS OPHTHALMIC
COMMUNITY
Start: 2024-03-08

## 2024-03-21 RX ORDER — TALC
POWDER (GRAM) TOPICAL EVERY 24 HOURS
COMMUNITY
Start: 2020-11-16

## 2024-03-21 RX ORDER — METHOCARBAMOL 500 MG/1
TABLET, FILM COATED ORAL
COMMUNITY
Start: 2023-06-14

## 2024-03-21 RX ORDER — OFLOXACIN 3 MG/ML
SOLUTION/ DROPS OPHTHALMIC
COMMUNITY
Start: 2024-02-27

## 2024-03-21 SDOH — ECONOMIC STABILITY: FOOD INSECURITY: WITHIN THE PAST 12 MONTHS, YOU WORRIED THAT YOUR FOOD WOULD RUN OUT BEFORE YOU GOT MONEY TO BUY MORE.: NEVER TRUE

## 2024-03-21 SDOH — ECONOMIC STABILITY: FOOD INSECURITY: WITHIN THE PAST 12 MONTHS, THE FOOD YOU BOUGHT JUST DIDN'T LAST AND YOU DIDN'T HAVE MONEY TO GET MORE.: NEVER TRUE

## 2024-03-21 ASSESSMENT — PAIN SCALES - GENERAL: PAINLEVEL: 0-NO PAIN

## 2024-03-21 ASSESSMENT — ENCOUNTER SYMPTOMS
OCCASIONAL FEELINGS OF UNSTEADINESS: 1
DEPRESSION: 0
LOSS OF SENSATION IN FEET: 1

## 2024-03-21 ASSESSMENT — LIFESTYLE VARIABLES
AUDIT-C TOTAL SCORE: 0
SKIP TO QUESTIONS 9-10: 1
HOW OFTEN DO YOU HAVE SIX OR MORE DRINKS ON ONE OCCASION: NEVER
HOW OFTEN DO YOU HAVE A DRINK CONTAINING ALCOHOL: NEVER
HOW MANY STANDARD DRINKS CONTAINING ALCOHOL DO YOU HAVE ON A TYPICAL DAY: PATIENT DOES NOT DRINK

## 2024-03-21 ASSESSMENT — COLUMBIA-SUICIDE SEVERITY RATING SCALE - C-SSRS
6. HAVE YOU EVER DONE ANYTHING, STARTED TO DO ANYTHING, OR PREPARED TO DO ANYTHING TO END YOUR LIFE?: NO
2. HAVE YOU ACTUALLY HAD ANY THOUGHTS OF KILLING YOURSELF?: NO
1. IN THE PAST MONTH, HAVE YOU WISHED YOU WERE DEAD OR WISHED YOU COULD GO TO SLEEP AND NOT WAKE UP?: NO

## 2024-03-21 ASSESSMENT — PATIENT HEALTH QUESTIONNAIRE - PHQ9
SUM OF ALL RESPONSES TO PHQ9 QUESTIONS 1 AND 2: 0
2. FEELING DOWN, DEPRESSED OR HOPELESS: NOT AT ALL
1. LITTLE INTEREST OR PLEASURE IN DOING THINGS: NOT AT ALL

## 2024-03-21 NOTE — LETTER
"March 22, 2024     David Granados DO  [address] [facility] [address]    Patient: Janice Avalos   YOB: 1940   Date of Visit: 3/21/2024       Dear Dr. David Granados DO:    Thank you for referring Janice Avalos to me for evaluation. Below are my notes for this consultation.  If you have questions, please do not hesitate to call me. I look forward to following your patient along with you.       Sincerely,     Vinayak Manning MD      CC: No Recipients  ______________________________________________________________________________________    SUBJECTIVE  Patient ID: Janice Avalos is a 83 y.o. female who presents for New Patient Visit (thyroid).    Referred by Dr. Granados.  Patient was last seen by me in October 2021 for dizziness and hearing concerns.    The patient reports that she has been following a thyroid nodules for some time. Had an ultrasound in July 2023 which has shown growth of a left-sided thyroid nodule. She has noted growth of the nodule and she is having choking episodes when lying flat. Was recommended by Dr. Mendes to get an ultrasound-guided biopsy. She opted not to get this done because of an upsetting experience at [facility] getting the thyroid ultrasound. She notes voice issues which she suspects is due to allergies. She notes a history of heartburn and hiatal hernia. She reports that food and pills get \"caught\" in her throat.    Review of Systems  Complete ROS negative except as noted above or on patient intake form and as above.    OBJECTIVE  Physical Exam  CONSTITUTIONAL: Well appearing female who appears stated age.  PSYCHIATRIC: Alert, appropriate mood and affect.  RESPIRATORY: Normal inspiration and expiration and chest wall expansion; no use of accessory muscles to breathe.  VOICE: Clear speech with mild hoarseness.  Slightly rough voicing, slight breathiness.  No stridor nor stertor.  HEAD, FACE, AND SKIN: Symmetric facial feature. Scattered neurofibromas overlying the nasal " dorsum tracking to the brows bilaterally. The parotid and submandibular glands were normal to palpation.  EYES: Pupils were equal in size. Extra-ocular muscle function was intact. No nystagmus was observed. Vision was grossly intact.  EARS: External ears were normally formed with no lesions. The external auditory canals had non-obstructive cerumen; slightly narrowed on the left. The tympanic membranes were intact and in the neutral position. No significant retraction pockets nor effusions were appreciated.  NOSE: Nasal dorsum was midline. Anterior rhinoscopy demonstrated a septum relatively midline. Dry mucosa.  No obvious nasal masses, polyps, mucopurulence, nor other lesions were appreciated.  ORAL CAVITY: Lips were without lesions. Moist mucous membranes. No lesions appreciated along the gingiva, oral mucosa, nor tongue.  DENTITION: Grossly normal without obvious infection nor inflammation. Mild attrition.  OROPHARYNX: No lesion nor mucosal abnormality. The uvula was normal appearing. The tonsils were 1+.  NECK: Visualization and palpation of the neck revealed prominent thyromegaly greater on the left; soft and mobile. No cutaneous lesions appreciated.  LYMPHATICS (CERVICAL): There were no palpable lymph nodes in the posterior triangle, submandibular triangle, jugulodigastric region, nor central neck.  NEUROLOGIC: Cranial nerves III, IV, and VI were noted to be intact via extra-ocular muscle movement testing. Cranial nerve V was noted to be intact to soft touch bilaterally. Cranial nerve VII was noted to be intact and symmetric by facial movement. Cranial nerve VIII was tested with normal voice examination and revealed grossly normal hearing. Cranial nerves IX and X noted to be intact by palatal movement. Cranial nerve XI noted to be intact via shoulder shrug.  Cranial nerve XII noted to be intact with active and symmetric tongue movement.     Radiology  Ultrasound report from 7/5/2023 reviewed. Most concerning  is a 58 mm nodule for the left thyroid gland that is 7 points on TI-RADS scale.    --------------------------------------------------  Procedure: Flexible Laryngoscopy - Diagnostic  Indication: voice change, thyroid nodule, dysphagia  Informed consent verbally obtained: The risks, benefits, alternatives, and expectations were discussed with the patient, who wished to proceed  Anesthesia: Topical lidocaine/oxymetazoline    Findings: After topical anesthetic was applied the flexible endoscope was advanced into the naris. 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  --------------------------------------------------    ASSESSMENT/PLAN  Diagnoses and all orders for this visit:  Nodule of left lobe of thyroid gland  -     Referral to ENT  Dysphonia  Presbylarynx  Dysphagia, unspecified type    83 y.o. female presenting with multiple issues.    Multinodular goiter, growing thyroid nodules  The patient reports that she has been following a growing thyroid nodule of the left thyroid gland for some time.  She notes that this is been persistently growing recent ultrasound July 2023 demonstrated growth and concerning enough findings that biopsy was recommended.  Several other smaller nodules also meet criteria for biopsy.    Patient reports that she had a relatively unpleasant experience with her thyroid ultrasound and was interested in different options regarding the thyroid ultrasound and biopsy.  I explained that I do not perform  ultrasound-guided biopsies.  I discussed her care with one of my head neck colleagues who recommended ultrasound guided biopsy with interventional radiology given multiple sites of recommended for biopsy.  He recommended alternative sites if she wishes to travel further a field.  I will call her to discuss.    Dysphonia, presbylarynx  The patient is also noted change in voicing and on exam has slight breathiness and roughness of voicing.  She had attributed this to environmental allergies.  Given her growing thyroid nodule I recommended evaluation with flexible laryngoscopy.  This thankfully demonstrated bilateral and symmetric vocal fold motion.  However, there does appear to be loss of the vocal fold line consistent with history of presbylarynx.  This was disclosed to the patient.  Depending on how much this bothers.  She could consider evaluation with speech therapy or laryngology.  She should have the thyroid issues resolved first.    Dysphagia  Patient is also noted changes in swallowing and some difficulties.  Once we have better figured out her thyroid issues we should consider further evaluation with modified barium swallow.    This note was created using speech recognition transcription software. Despite proofreading, typographical errors may be present that affect the meaning of the content. Please contact my office with any questions.    Greater than 40 minutes was spent on this patient's care. This time was spent in chart review, patient interview and examination, and in discussion with the patient. Further time was spent in communicating with our head and neck team regarding further care.

## 2024-03-21 NOTE — PROGRESS NOTES
"SUBJECTIVE  Patient ID: Janice Avalos is a 83 y.o. female who presents for New Patient Visit (thyroid).    Referred by Dr. Granados.  Patient was last seen by me in October 2021 for dizziness and hearing concerns.    The patient reports that she has been following a thyroid nodules for some time. Had an ultrasound in July 2023 which has shown growth of a left-sided thyroid nodule. She has noted growth of the nodule and she is having choking episodes when lying flat. Was recommended by Dr. Mendes to get an ultrasound-guided biopsy. She opted not to get this done because of an upsetting experience at [facility] getting the thyroid ultrasound. She notes voice issues which she suspects is due to allergies. She notes a history of heartburn and hiatal hernia. She reports that food and pills get \"caught\" in her throat.    Review of Systems  Complete ROS negative except as noted above or on patient intake form and as above.    OBJECTIVE  Physical Exam  CONSTITUTIONAL: Well appearing female who appears stated age.  PSYCHIATRIC: Alert, appropriate mood and affect.  RESPIRATORY: Normal inspiration and expiration and chest wall expansion; no use of accessory muscles to breathe.  VOICE: Clear speech with mild hoarseness.  Slightly rough voicing, slight breathiness.  No stridor nor stertor.  HEAD, FACE, AND SKIN: Symmetric facial feature. Scattered neurofibromas overlying the nasal dorsum tracking to the brows bilaterally. The parotid and submandibular glands were normal to palpation.  EYES: Pupils were equal in size. Extra-ocular muscle function was intact. No nystagmus was observed. Vision was grossly intact.  EARS: External ears were normally formed with no lesions. The external auditory canals had non-obstructive cerumen; slightly narrowed on the left. The tympanic membranes were intact and in the neutral position. No significant retraction pockets nor effusions were appreciated.  NOSE: Nasal dorsum was midline. Anterior rhinoscopy " demonstrated a septum relatively midline. Dry mucosa.  No obvious nasal masses, polyps, mucopurulence, nor other lesions were appreciated.  ORAL CAVITY: Lips were without lesions. Moist mucous membranes. No lesions appreciated along the gingiva, oral mucosa, nor tongue.  DENTITION: Grossly normal without obvious infection nor inflammation. Mild attrition.  OROPHARYNX: No lesion nor mucosal abnormality. The uvula was normal appearing. The tonsils were 1+.  NECK: Visualization and palpation of the neck revealed prominent thyromegaly greater on the left; soft and mobile. No cutaneous lesions appreciated.  LYMPHATICS (CERVICAL): There were no palpable lymph nodes in the posterior triangle, submandibular triangle, jugulodigastric region, nor central neck.  NEUROLOGIC: Cranial nerves III, IV, and VI were noted to be intact via extra-ocular muscle movement testing. Cranial nerve V was noted to be intact to soft touch bilaterally. Cranial nerve VII was noted to be intact and symmetric by facial movement. Cranial nerve VIII was tested with normal voice examination and revealed grossly normal hearing. Cranial nerves IX and X noted to be intact by palatal movement. Cranial nerve XI noted to be intact via shoulder shrug.  Cranial nerve XII noted to be intact with active and symmetric tongue movement.     Radiology  Ultrasound report from 7/5/2023 reviewed. Most concerning is a 58 mm nodule for the left thyroid gland that is 7 points on TI-RADS scale.    --------------------------------------------------  Procedure: Flexible Laryngoscopy - Diagnostic  Indication: voice change, thyroid nodule, dysphagia  Informed consent verbally obtained: The risks, benefits, alternatives, and expectations were discussed with the patient, who wished to proceed  Anesthesia: Topical lidocaine/oxymetazoline    Findings: After topical anesthetic was applied the flexible endoscope was advanced into the naris. The nasal cavity, nasopharynx,  oropharynx, hypopharynx, and larynx were evaluated and were normal with the following significant findings or exceptions:    - No drainage from the MM nor SER; patent right posterior fontanelle without drainage  - No masses/lesions appreciated  - Normal and symmetric TVC motion bilaterally  - Mild presbylarynx with slight glottal gap on adduction  - No significant pharyngeal, supraglottic, nor glottic edema  - No pooling of secretions    There were no complications and the patient tolerated the procedure well.  --------------------------------------------------    ASSESSMENT/PLAN  Diagnoses and all orders for this visit:  Nodule of left lobe of thyroid gland  -     Referral to ENT  Dysphonia  Presbylarynx  Dysphagia, unspecified type    83 y.o. female presenting with multiple issues.    Multinodular goiter, growing thyroid nodules  The patient reports that she has been following a growing thyroid nodule of the left thyroid gland for some time.  She notes that this is been persistently growing recent ultrasound July 2023 demonstrated growth and concerning enough findings that biopsy was recommended.  Several other smaller nodules also meet criteria for biopsy.    Patient reports that she had a relatively unpleasant experience with her thyroid ultrasound and was interested in different options regarding the thyroid ultrasound and biopsy.  I explained that I do not perform ultrasound-guided biopsies.  I discussed her care with one of my head neck colleagues who recommended ultrasound guided biopsy with interventional radiology given multiple sites of recommended for biopsy.  He recommended alternative sites if she wishes to travel further a field.  I will call her to discuss.    Dysphonia, presbylarynx  The patient is also noted change in voicing and on exam has slight breathiness and roughness of voicing.  She had attributed this to environmental allergies.  Given her growing thyroid nodule I recommended evaluation  with flexible laryngoscopy.  This thankfully demonstrated bilateral and symmetric vocal fold motion.  However, there does appear to be loss of the vocal fold line consistent with history of presbylarynx.  This was disclosed to the patient.  Depending on how much this bothers.  She could consider evaluation with speech therapy or laryngology.  She should have the thyroid issues resolved first.    Dysphagia  Patient is also noted changes in swallowing and some difficulties.  Once we have better figured out her thyroid issues we should consider further evaluation with modified barium swallow.    This note was created using speech recognition transcription software. Despite proofreading, typographical errors may be present that affect the meaning of the content. Please contact my office with any questions.    Greater than 40 minutes was spent on this patient's care. This time was spent in chart review, patient interview and examination, and in discussion with the patient. Further time was spent in communicating with our head and neck team regarding further care.

## 2024-03-22 ENCOUNTER — TELEPHONE (OUTPATIENT)
Dept: OTOLARYNGOLOGY | Facility: HOSPITAL | Age: 84
End: 2024-03-22
Payer: MEDICARE

## 2024-03-22 NOTE — TELEPHONE ENCOUNTER
Called patient to discuss updates after discussion with my head and neck colleague. No answer; voicemail left.

## 2024-03-26 ENCOUNTER — OFFICE VISIT (OUTPATIENT)
Dept: UROLOGY | Facility: CLINIC | Age: 84
End: 2024-03-26
Payer: MEDICARE

## 2024-03-26 ENCOUNTER — TELEPHONE (OUTPATIENT)
Dept: OTOLARYNGOLOGY | Facility: HOSPITAL | Age: 84
End: 2024-03-26

## 2024-03-26 VITALS
DIASTOLIC BLOOD PRESSURE: 87 MMHG | HEIGHT: 67 IN | BODY MASS INDEX: 24.96 KG/M2 | SYSTOLIC BLOOD PRESSURE: 155 MMHG | HEART RATE: 92 BPM | WEIGHT: 159 LBS

## 2024-03-26 DIAGNOSIS — N39.0 RECURRENT UTI: Primary | ICD-10-CM

## 2024-03-26 DIAGNOSIS — N95.8 GENITOURINARY SYNDROME OF MENOPAUSE: ICD-10-CM

## 2024-03-26 DIAGNOSIS — E04.1 NODULE OF LEFT LOBE OF THYROID GLAND: Primary | ICD-10-CM

## 2024-03-26 DIAGNOSIS — N32.81 OVERACTIVE BLADDER: ICD-10-CM

## 2024-03-26 DIAGNOSIS — R35.0 URINE FREQUENCY: ICD-10-CM

## 2024-03-26 LAB
APPEARANCE UR: ABNORMAL
BACTERIA #/AREA URNS AUTO: ABNORMAL /HPF
BILIRUB UR STRIP.AUTO-MCNC: NEGATIVE MG/DL
CAOX CRY #/AREA UR COMP ASSIST: ABNORMAL /HPF
COLOR UR: YELLOW
GLUCOSE UR STRIP.AUTO-MCNC: NEGATIVE MG/DL
HYALINE CASTS #/AREA URNS AUTO: ABNORMAL /LPF
KETONES UR STRIP.AUTO-MCNC: NEGATIVE MG/DL
LEUKOCYTE ESTERASE UR QL STRIP.AUTO: ABNORMAL
MUCOUS THREADS #/AREA URNS AUTO: ABNORMAL /LPF
NITRITE UR QL STRIP.AUTO: NEGATIVE
PH UR STRIP.AUTO: 6 [PH]
POC APPEARANCE, URINE: CLEAR
POC BILIRUBIN, URINE: NEGATIVE
POC BLOOD, URINE: ABNORMAL
POC COLOR, URINE: YELLOW
POC GLUCOSE, URINE: NEGATIVE MG/DL
POC KETONES, URINE: ABNORMAL MG/DL
POC LEUKOCYTES, URINE: ABNORMAL
POC NITRITE,URINE: NEGATIVE
POC PH, URINE: 7 PH
POC PROTEIN, URINE: ABNORMAL MG/DL
POC SPECIFIC GRAVITY, URINE: 1.02
POC UROBILINOGEN, URINE: 0.2 EU/DL
PROT UR STRIP.AUTO-MCNC: NEGATIVE MG/DL
RBC # UR STRIP.AUTO: NEGATIVE /UL
RBC #/AREA URNS AUTO: ABNORMAL /HPF
SP GR UR STRIP.AUTO: 1.02
SQUAMOUS #/AREA URNS AUTO: ABNORMAL /HPF
UROBILINOGEN UR STRIP.AUTO-MCNC: <2 MG/DL
WBC #/AREA URNS AUTO: ABNORMAL /HPF

## 2024-03-26 PROCEDURE — 1159F MED LIST DOCD IN RCRD: CPT | Performed by: NURSE PRACTITIONER

## 2024-03-26 PROCEDURE — 81003 URINALYSIS AUTO W/O SCOPE: CPT | Performed by: NURSE PRACTITIONER

## 2024-03-26 PROCEDURE — 87086 URINE CULTURE/COLONY COUNT: CPT

## 2024-03-26 PROCEDURE — 3079F DIAST BP 80-89 MM HG: CPT | Performed by: NURSE PRACTITIONER

## 2024-03-26 PROCEDURE — 1157F ADVNC CARE PLAN IN RCRD: CPT | Performed by: NURSE PRACTITIONER

## 2024-03-26 PROCEDURE — 99213 OFFICE O/P EST LOW 20 MIN: CPT | Performed by: NURSE PRACTITIONER

## 2024-03-26 PROCEDURE — 81001 URINALYSIS AUTO W/SCOPE: CPT

## 2024-03-26 PROCEDURE — 1160F RVW MEDS BY RX/DR IN RCRD: CPT | Performed by: NURSE PRACTITIONER

## 2024-03-26 PROCEDURE — 1036F TOBACCO NON-USER: CPT | Performed by: NURSE PRACTITIONER

## 2024-03-26 PROCEDURE — 51798 US URINE CAPACITY MEASURE: CPT | Performed by: NURSE PRACTITIONER

## 2024-03-26 PROCEDURE — 3077F SYST BP >= 140 MM HG: CPT | Performed by: NURSE PRACTITIONER

## 2024-03-26 NOTE — TELEPHONE ENCOUNTER
Called patient to discuss my recent conversation with head and neck colleagues.  He does not believe that he would be able to do a better job than interventional radiology in addressing the 3 nodules that need biopsies.  Recommended that she go back to interventional radiology for this.  Discussed that she has several options within  if she wishes to stay within  system to get the biopsy.  She understands and was grateful for the attention paid to this.  Order placed.

## 2024-03-26 NOTE — PATIENT INSTRUCTIONS
Continue UTI prevention, estrogen cream  Can't do cranberry or dmanose w coumadin  OAB not bothersome enough at this time to do a medicine  Bladder retraining discussed  Follow up one year UA PVR

## 2024-03-26 NOTE — PROGRESS NOTES
"03/26/24   [id number]    Chief Complaint   Patient presents with    Follow-up     6 month      Subjective      HPI Janice Avalos is a 83 y.o. female who presents for follow up urinary urge incontinence and frequent UTIs, has done well since last seen 8/3/23; using the estrogen cream, discussed that is part of long term plan; has a lot at home, will call needs refills; no hematuria; last urine on chart was prior to last visit 5/2023; occasional leakage; not bothersome enough for medicine; still 100 % better;     UA large leuk, trace heme, PVR 0 cc         PMH, PSH, SH, FH reviewed.  PMH: DVT, HTN, arthritis, glaucoma, macular degeneration,   PSH: hysterectomy b/l oophorectomy, lumpectomy (benign)  FH: no urological or female cancers  SH: never smoked, domestic ;       hysterectomy and b/l oophorectomy d/t bleeding, no sensation of prolapse  3 vaginal deliveries       Objective     /87   Pulse 92   Ht 1.702 m (5' 7\")   Wt 72.1 kg (159 lb)   BMI 24.90 kg/m²    Physical Exam    General: Appears comfortable and in no apparent distress, well nourished  Head: Normocephalic, atraumatic  Neck: trachea midline  Respiratory: respirations unlabored, no wheezes, and no use of accessory muscles  Cardiovascular: at rest no dyspnea, well perfused  Skin: no visible rashes or lesions  Neurologic: grossly intact, oriented to person, place, and time  Psychiatric: mood and affect appropriate  Musculoskeletal: in chair for appt. no difficulty w upper body movement    Assessment/Plan   Problem List Items Addressed This Visit          Genitourinary and Reproductive    Overactive bladder     Other Visit Diagnoses       Recurrent UTI    -  Primary    Genitourinary syndrome of menopause              No orders of the defined types were placed in this encounter.            Citlali L Gonzales, APRN-CNP  Lab Results   Component Value Date    GLUCOSE 87 01/17/2024    CALCIUM 9.2 01/17/2024     01/17/2024    K 4.3 01/17/2024    CO2 26 " 01/17/2024     01/17/2024    BUN 12 01/17/2024    CREATININE 0.59 01/17/2024

## 2024-03-27 LAB — BACTERIA UR CULT: NORMAL

## 2024-03-28 NOTE — PROGRESS NOTES
Spoke w patient regarding microscopic hematuria noted, she doesn't want to proceed w imaging or cystoscopy at this time as she notes that  she has had the microscopic hematuria for 40 yrs. She has many other health issues going on.   Discussed recommendations of CT Urogram and cystoscopy to evaluate for any cancers, tumors, kidney stones. She verbalized understanding and will call back if desires to proceed.

## 2024-04-11 ENCOUNTER — OFFICE VISIT (OUTPATIENT)
Dept: PRIMARY CARE | Facility: CLINIC | Age: 84
End: 2024-04-11
Payer: MEDICARE

## 2024-04-11 VITALS
BODY MASS INDEX: 25.27 KG/M2 | TEMPERATURE: 97.8 F | OXYGEN SATURATION: 95 % | HEART RATE: 60 BPM | RESPIRATION RATE: 16 BRPM | WEIGHT: 161 LBS | SYSTOLIC BLOOD PRESSURE: 140 MMHG | HEIGHT: 67 IN | DIASTOLIC BLOOD PRESSURE: 77 MMHG

## 2024-04-11 DIAGNOSIS — R73.03 PRE-DIABETES: ICD-10-CM

## 2024-04-11 DIAGNOSIS — Z86.718 HISTORY OF DVT (DEEP VEIN THROMBOSIS): ICD-10-CM

## 2024-04-11 DIAGNOSIS — R26.81 UNSTEADY GAIT: ICD-10-CM

## 2024-04-11 DIAGNOSIS — I82.5Z3 CHRONIC DEEP VEIN THROMBOSIS (DVT) OF DISTAL VEIN OF BOTH LOWER EXTREMITIES (MULTI): Primary | ICD-10-CM

## 2024-04-11 DIAGNOSIS — E73.9 LACTOSE INTOLERANCE: ICD-10-CM

## 2024-04-11 DIAGNOSIS — N32.81 OVERACTIVE BLADDER: ICD-10-CM

## 2024-04-11 DIAGNOSIS — S32.040S CLOSED COMPRESSION FRACTURE OF L4 VERTEBRA, SEQUELA: ICD-10-CM

## 2024-04-11 LAB
POC FINGERSTICK BLOOD GLUCOSE: 106 MG/DL (ref 70–100)
POC INR: 1.8 (ref 0.9–1.1)

## 2024-04-11 PROCEDURE — 1159F MED LIST DOCD IN RCRD: CPT | Performed by: FAMILY MEDICINE

## 2024-04-11 PROCEDURE — 1157F ADVNC CARE PLAN IN RCRD: CPT | Performed by: FAMILY MEDICINE

## 2024-04-11 PROCEDURE — 1036F TOBACCO NON-USER: CPT | Performed by: FAMILY MEDICINE

## 2024-04-11 PROCEDURE — 85610 PROTHROMBIN TIME: CPT | Performed by: FAMILY MEDICINE

## 2024-04-11 PROCEDURE — 3078F DIAST BP <80 MM HG: CPT | Performed by: FAMILY MEDICINE

## 2024-04-11 PROCEDURE — 99214 OFFICE O/P EST MOD 30 MIN: CPT | Performed by: FAMILY MEDICINE

## 2024-04-11 PROCEDURE — 3077F SYST BP >= 140 MM HG: CPT | Performed by: FAMILY MEDICINE

## 2024-04-11 PROCEDURE — 82962 GLUCOSE BLOOD TEST: CPT | Performed by: FAMILY MEDICINE

## 2024-04-11 PROCEDURE — 1160F RVW MEDS BY RX/DR IN RCRD: CPT | Performed by: FAMILY MEDICINE

## 2024-04-11 PROCEDURE — 1126F AMNT PAIN NOTED NONE PRSNT: CPT | Performed by: FAMILY MEDICINE

## 2024-04-11 ASSESSMENT — PAIN SCALES - GENERAL: PAINLEVEL: 0-NO PAIN

## 2024-04-11 NOTE — PROGRESS NOTES
Subjective   Janice Avalos is a 83 y.o. female who presents for Follow-up (Follow up visit for coumadin level).    HPI  : Patient is an 83-year-old female who is in for a follow-up visit ,  blood pressure recheck ,  back pain, blood sugar and Coumadin level.  Patient is also being treated by ophthalmology for an eye infection.  She currently is wearing her sunglasses since she has many eye issues.      Objective  : ROS :10 systems were reviewed and the information is included in the HPI and no additional review of systems is indicated.    Physical Exam  Vitals and nursing note reviewed.   Constitutional:       Appearance: Normal appearance.      Comments: Patient is alert and oriented x3.   No acute distress   HENT:      Head: Normocephalic.      Right Ear: Tympanic membrane and external ear normal.      Left Ear: Tympanic membrane and external ear normal.      Ears:      Comments: Ears are patent bilaterally and TMs are clear.     Nose: Nose normal.      Mouth/Throat:      Mouth: Mucous membranes are moist.      Pharynx: Oropharynx is clear.      Comments: Mouth is moist, tongue is midline.  No posterior pharyngeal erythema.  Eyes:      Extraocular Movements: Extraocular movements intact.      Conjunctiva/sclera: Conjunctivae normal.      Pupils: Pupils are equal, round, and reactive to light.      Comments: Patient currently has an eye infection and is following with ophthalmology.   Neck:      Comments: Restricted range of motion cervical spine due to arthritis and secondary muscle spasm.  No carotid bruits, no cervical adenopathy.  Occasional neck spasm and restriction of motion secondary to stress and tension.  Patient also has thyromegaly and is scheduled for a ultrasound-guided needle biopsy towards the end of this months.  Cardiovascular:      Rate and Rhythm: Normal rate and regular rhythm.      Pulses: Normal pulses.      Heart sounds: Normal heart sounds.      Comments: Patient denies chest pain and no  palpitations.  Heart rhythm is stable S1 and S2 are noted, no ectopics.  Pulmonary:      Effort: Pulmonary effort is normal.      Breath sounds: Normal breath sounds.      Comments: Patient denies any coughing or wheezing.  Lungs are clear to auscultation.    Abdominal:      General: Bowel sounds are normal.      Palpations: Abdomen is soft.      Comments: Abdomen is soft and nontender, no hepatosplenomegaly.  No flank tenderness.  No suprapubic pain.  Positive bowel sounds x4.  No abdominal guarding and no rebound tenderness.   Genitourinary:     Comments: Patient denies dysuria, no hematuria, denies flank pain.  Occasional overactive bladder with nocturia.  Musculoskeletal:         General: Tenderness present. Normal range of motion.      Cervical back: Normal range of motion. Tenderness present.      Comments: Patient has a compression fracture in her lumbar spine and does have chronic back pain.  Currently it seems stable and she is not in severe discomfort.  She occasionally has to ambulate with a cane or a walker when her back pain gets worse.  Osteoarthritis of the hips and knees.     Skin:     General: Skin is warm and dry.      Findings: Bruising present.      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  Patient does get bruising from her Coumadin and she does have dry skin.   Neurological:      General: No focal deficit present.      Mental Status: She is alert and oriented to person, place, and time. Mental status is at baseline.      Sensory: Sensory deficit present.      Coordination: Coordination abnormal.      Deep Tendon Reflexes: Reflexes abnormal.      Comments: Patient does have some peripheral neuropathy from lumbosacral disc disease.  She also has a lumbar compression fracture.  Currently he is ambulating better and is stable but occasionally has to use a cane or a walker.  Caution with her coordination and gait.  Occasional muscle weakness in the  lower extremities.  Decreased reflexes lower extremities.   Psychiatric:         Mood and Affect: Mood normal.         Behavior: Behavior normal.         Thought Content: Thought content normal.         Judgment: Judgment normal.      Comments: Patient has normal mood and affect.  Thought content and judgment are stable.  No signs of vascular dementia.  Behavior is normal.     PLAN : Patient is an 83-year-old female who was evaluated for several problems and concerns today.  Her blood pressure is stable and her back pain is actually slowly improving but she has to watch her ambulation so she does not lose her balance.  Today her blood sugar was 106 and her Coumadin INR was a little bit low at 1.8.  She will continue taking her Coumadin as directed and will take an extra half a dose today to increase the INR.  She also is using eyedrops for an eye infection from ophthalmology.  She otherwise is stable we will follow-up as needed in the next 3 or 4 months.    Problem List Items Addressed This Visit       History of DVT (deep vein thrombosis)    Lactose intolerance    Pre-diabetes    Unsteady gait    Overactive bladder    Deep vein thrombosis (DVT) of both lower extremities (CMS/HCC) - Primary    Closed compression fracture of fourth lumbar vertebra (CMS/HCC)            David Granados,

## 2024-04-24 ENCOUNTER — HOSPITAL ENCOUNTER (OUTPATIENT)
Dept: RADIOLOGY | Facility: HOSPITAL | Age: 84
Discharge: HOME | End: 2024-04-24
Payer: MEDICARE

## 2024-04-24 VITALS
HEART RATE: 60 BPM | RESPIRATION RATE: 18 BRPM | HEIGHT: 67 IN | DIASTOLIC BLOOD PRESSURE: 79 MMHG | OXYGEN SATURATION: 98 % | WEIGHT: 160 LBS | TEMPERATURE: 98.2 F | SYSTOLIC BLOOD PRESSURE: 144 MMHG | BODY MASS INDEX: 25.11 KG/M2

## 2024-04-24 DIAGNOSIS — E04.1 NODULE OF LEFT LOBE OF THYROID GLAND: ICD-10-CM

## 2024-04-24 PROCEDURE — 88112 CYTOPATH CELL ENHANCE TECH: CPT | Performed by: PATHOLOGY

## 2024-04-24 PROCEDURE — 2500000005 HC RX 250 GENERAL PHARMACY W/O HCPCS: Performed by: RADIOLOGY

## 2024-04-24 PROCEDURE — 10006 FNA BX W/US GDN EA ADDL: CPT | Performed by: RADIOLOGY

## 2024-04-24 PROCEDURE — 76942 ECHO GUIDE FOR BIOPSY: CPT

## 2024-04-24 PROCEDURE — 10005 FNA BX W/US GDN 1ST LES: CPT | Performed by: RADIOLOGY

## 2024-04-24 PROCEDURE — 88112 CYTOPATH CELL ENHANCE TECH: CPT | Mod: TC,91 | Performed by: STUDENT IN AN ORGANIZED HEALTH CARE EDUCATION/TRAINING PROGRAM

## 2024-04-24 RX ORDER — LIDOCAINE HYDROCHLORIDE 10 MG/ML
INJECTION, SOLUTION EPIDURAL; INFILTRATION; INTRACAUDAL; PERINEURAL
Status: COMPLETED | OUTPATIENT
Start: 2024-04-24 | End: 2024-04-24

## 2024-04-24 RX ADMIN — LIDOCAINE HYDROCHLORIDE 5 ML: 10 INJECTION, SOLUTION EPIDURAL; INFILTRATION; INTRACAUDAL; PERINEURAL at 08:43

## 2024-04-24 ASSESSMENT — PAIN SCALES - GENERAL
PAINLEVEL_OUTOF10: 0 - NO PAIN

## 2024-04-24 ASSESSMENT — PAIN - FUNCTIONAL ASSESSMENT: PAIN_FUNCTIONAL_ASSESSMENT: 0-10

## 2024-04-24 NOTE — PRE-PROCEDURE NOTE
Interventional Radiology Preprocedure Note    Indication for procedure: The encounter diagnosis was Nodule of left lobe of thyroid gland.    Relevant review of systems: NA    Relevant Labs:   Lab Results   Component Value Date    CREATININE 0.59 01/17/2024    EGFR 90 01/17/2024    INR 1.8 (A) 04/11/2024    PROTIME 36.6 (H) 07/24/2022       Planned Sedation/Anesthesia: Moderate    Airway assessment: normal    Directed physical examination:    Aox3  No increased work of breathing.   No acute distress      Mallampati: II (hard and soft palate, upper portion of tonsils anduvula visible)    ASA Score: ASA 2 - Patient with mild systemic disease with no functional limitations    Benefits, risks and alternatives of procedure and planned sedation have been discussed with the patient and/or their representative. All questions answered and they agree to proceed.

## 2024-04-24 NOTE — PROCEDURES
Interventional Radiology Brief Postprocedure Note    Attending: Eder Ng MD    Assistant:   Staff Role   Yelitza Reyes Radiology Technologist   Gissel Patel, RN Radiology Nurse   Eder gN MD Radiologist   Carmita Samayoa, RN Radiology Nurse       Diagnosis:   1. Nodule of left lobe of thyroid gland  US guided fine percutaneous aspiration    US guided fine percutaneous aspiration          Description of procedure: US guided fine percutaneous aspiration right mid ,left lower, left upper nodules. 25 G x 4 each FNA    Timeout:  Yes    Procedure Area: Procedure Area     Anesthesia:   local    Complications: None    Estimated Blood Loss: minimal    Medications (Filter: Administrations occurring from 0823 to 0858 on 04/24/24) As of 04/24/24 0858      lidocaine PF (Xylocaine) 10 mg/mL (1 %) injection (mL) Total volume:  5 mL      Date/Time Rate/Dose/Volume Action       04/24/24  0843 5 mL Given                   No specimens collected      See detailed result report with images in PACS.    The patient tolerated the procedure well without incident or complication and is in stable condition.

## 2024-04-24 NOTE — Clinical Note
Two bandaids placed to left neck, one bandaid placed to right neck; dressings dry and intact. Pt tolerated procedure well

## 2024-04-25 LAB
LABORATORY COMMENT REPORT: NORMAL
LABORATORY COMMENT REPORT: NORMAL
PATH REPORT.FINAL DX SPEC: NORMAL
PATH REPORT.GROSS SPEC: NORMAL
PATH REPORT.RELEVANT HX SPEC: NORMAL
PATH REPORT.TOTAL CANCER: NORMAL

## 2024-05-07 ENCOUNTER — TELEPHONE (OUTPATIENT)
Dept: OTOLARYNGOLOGY | Facility: CLINIC | Age: 84
End: 2024-05-07
Payer: MEDICARE

## 2024-05-08 ENCOUNTER — TELEPHONE (OUTPATIENT)
Dept: OTOLARYNGOLOGY | Facility: CLINIC | Age: 84
End: 2024-05-08
Payer: MEDICARE

## 2024-05-08 DIAGNOSIS — R13.10 DYSPHAGIA, UNSPECIFIED TYPE: Primary | ICD-10-CM

## 2024-05-08 DIAGNOSIS — E04.1 NODULE OF LEFT LOBE OF THYROID GLAND: ICD-10-CM

## 2024-05-08 NOTE — TELEPHONE ENCOUNTER
Discussed recent FNA results of thyroid nodules.  All nodules are benign.  Because of last nodule that did not meet criteria for biopsy a follow-up ultrasound was recommended at 1 year.  Repeat ultrasound ordered.    Patient also noting persistent dysphagia and globus sensation.  Recommended evaluation with modified barium swallow.  Depending on those results patient could also consider evaluation with head and neck surgery to discuss thyroidectomy.

## 2024-06-12 DIAGNOSIS — I10 PRIMARY HYPERTENSION: ICD-10-CM

## 2024-06-12 RX ORDER — AMLODIPINE BESYLATE 10 MG/1
TABLET ORAL
Qty: 90 TABLET | Refills: 3 | Status: SHIPPED | OUTPATIENT
Start: 2024-06-12

## 2024-06-18 DIAGNOSIS — N95.2 VAGINAL ATROPHY: ICD-10-CM

## 2024-06-18 RX ORDER — ESTRADIOL 0.1 MG/G
CREAM VAGINAL
Qty: 42.5 G | Refills: 0 | Status: SHIPPED | OUTPATIENT
Start: 2024-06-18

## 2024-06-19 ENCOUNTER — APPOINTMENT (OUTPATIENT)
Dept: PRIMARY CARE | Facility: CLINIC | Age: 84
End: 2024-06-19
Payer: MEDICARE

## 2024-06-19 VITALS
BODY MASS INDEX: 24.96 KG/M2 | TEMPERATURE: 97.9 F | HEIGHT: 67 IN | SYSTOLIC BLOOD PRESSURE: 138 MMHG | OXYGEN SATURATION: 96 % | WEIGHT: 159 LBS | HEART RATE: 55 BPM | RESPIRATION RATE: 18 BRPM | DIASTOLIC BLOOD PRESSURE: 76 MMHG

## 2024-06-19 DIAGNOSIS — R42 DIZZINESS: ICD-10-CM

## 2024-06-19 DIAGNOSIS — H35.3231 EXUDATIVE AGE-RELATED MACULAR DEGENERATION, BILATERAL, WITH ACTIVE CHOROIDAL NEOVASCULARIZATION (MULTI): ICD-10-CM

## 2024-06-19 DIAGNOSIS — Z79.01 ANTICOAGULATED ON COUMADIN: ICD-10-CM

## 2024-06-19 DIAGNOSIS — Z86.718 HISTORY OF DVT (DEEP VEIN THROMBOSIS): ICD-10-CM

## 2024-06-19 DIAGNOSIS — G89.29 CHRONIC MIDLINE LOW BACK PAIN WITHOUT SCIATICA: ICD-10-CM

## 2024-06-19 DIAGNOSIS — I27.82 CHRONIC PULMONARY EMBOLISM WITHOUT ACUTE COR PULMONALE, UNSPECIFIED PULMONARY EMBOLISM TYPE (MULTI): Primary | ICD-10-CM

## 2024-06-19 DIAGNOSIS — R73.9 HYPERGLYCEMIA: ICD-10-CM

## 2024-06-19 DIAGNOSIS — S32.040S CLOSED COMPRESSION FRACTURE OF L4 VERTEBRA, SEQUELA: ICD-10-CM

## 2024-06-19 DIAGNOSIS — H53.9 VISION DISTURBANCE: ICD-10-CM

## 2024-06-19 DIAGNOSIS — S32.010A CLOSED COMPRESSION FRACTURE OF BODY OF L1 VERTEBRA (MULTI): ICD-10-CM

## 2024-06-19 DIAGNOSIS — M54.50 CHRONIC MIDLINE LOW BACK PAIN WITHOUT SCIATICA: ICD-10-CM

## 2024-06-19 DIAGNOSIS — I10 PRIMARY HYPERTENSION: ICD-10-CM

## 2024-06-19 LAB
POC FINGERSTICK BLOOD GLUCOSE: 104 MG/DL (ref 70–100)
POC INR: 1.6 (ref 0.9–1.1)

## 2024-06-19 PROCEDURE — 99214 OFFICE O/P EST MOD 30 MIN: CPT | Performed by: FAMILY MEDICINE

## 2024-06-19 PROCEDURE — 3075F SYST BP GE 130 - 139MM HG: CPT | Performed by: FAMILY MEDICINE

## 2024-06-19 PROCEDURE — 1157F ADVNC CARE PLAN IN RCRD: CPT | Performed by: FAMILY MEDICINE

## 2024-06-19 PROCEDURE — 85610 PROTHROMBIN TIME: CPT | Performed by: FAMILY MEDICINE

## 2024-06-19 PROCEDURE — 1160F RVW MEDS BY RX/DR IN RCRD: CPT | Performed by: FAMILY MEDICINE

## 2024-06-19 PROCEDURE — 82962 GLUCOSE BLOOD TEST: CPT | Performed by: FAMILY MEDICINE

## 2024-06-19 PROCEDURE — 1036F TOBACCO NON-USER: CPT | Performed by: FAMILY MEDICINE

## 2024-06-19 PROCEDURE — 1126F AMNT PAIN NOTED NONE PRSNT: CPT | Performed by: FAMILY MEDICINE

## 2024-06-19 PROCEDURE — 3078F DIAST BP <80 MM HG: CPT | Performed by: FAMILY MEDICINE

## 2024-06-19 PROCEDURE — 1159F MED LIST DOCD IN RCRD: CPT | Performed by: FAMILY MEDICINE

## 2024-06-19 ASSESSMENT — PATIENT HEALTH QUESTIONNAIRE - PHQ9
1. LITTLE INTEREST OR PLEASURE IN DOING THINGS: NOT AT ALL
2. FEELING DOWN, DEPRESSED OR HOPELESS: NOT AT ALL
SUM OF ALL RESPONSES TO PHQ9 QUESTIONS 1 AND 2: 0

## 2024-06-19 ASSESSMENT — PAIN SCALES - GENERAL: PAINLEVEL: 0-NO PAIN

## 2024-06-19 NOTE — PROGRESS NOTES
Subjective   Janice Avalos is a 83 y.o. female who presents for Follow-up (Follow up visit for Coumadin check today).    HPI  : Patient is an 83-year-old female who is in for a follow-up visit to check her blood sugar and Coumadin INR.  She has been on Coumadin for many years due to pulmonary emboli and chronic DVTs.  She follows closely with her retinal specialist due to macular degeneration.  She also has a history of lumbar compression fractures and has to be very cautious she does not fall.  She usually has a cane for balance and previously had been using a walker.  She had previously been prediabetic but currently has lost weight and her sugar is usually under good control.  Patient also has hyper tension and a history of chronic vertigo due to her poor vision.    Objective  : ROS :10 systems were reviewed and the information is included in the HPI and no additional review of systems is indicated.    Physical Exam  Vitals and nursing note reviewed.   Constitutional:       Appearance: Normal appearance.      Comments: Patient is alert and oriented x3.   No acute distress   HENT:      Head: Normocephalic.      Right Ear: Tympanic membrane and external ear normal.      Left Ear: Tympanic membrane and external ear normal.      Ears:      Comments: Ears are patent bilaterally and TMs are clear.     Nose: Nose normal.      Mouth/Throat:      Mouth: Mucous membranes are moist.      Pharynx: Oropharynx is clear.      Comments: Mouth is moist, tongue is midline.  No posterior pharyngeal erythema.  Eyes:      Extraocular Movements: Extraocular movements intact.      Conjunctiva/sclera: Conjunctivae normal.      Pupils: Pupils are equal, round, and reactive to light.      Comments: Patient has poor vision and macular degeneration and does follow with a retinal specialist.   Neck:      Comments: Restricted range of motion cervical spine due to arthritis.  No carotid bruits, no thyromegaly, no cervical  adenopathy.  Cardiovascular:      Rate and Rhythm: Normal rate and regular rhythm.      Pulses: Normal pulses.      Heart sounds: Normal heart sounds.      Comments: Patient denies chest pain and no palpitations.  Heart rhythm is stable S1 and S2 are noted, no ectopics.  Pulmonary:      Effort: Pulmonary effort is normal.      Breath sounds: Normal breath sounds.      Comments: Patient denies any coughing or wheezing.  Lungs are clear to auscultation.  Past history of pulmonary emboli but currently is stable and pulse ox is 94%.  Abdominal:      General: Bowel sounds are normal.      Palpations: Abdomen is soft.      Comments: Abdomen is soft and non tender. No flank tenderness.  No suprapubic pain.  Positive bowel sounds .  No abdominal guarding and no rebound tenderness.   Genitourinary:     Comments: Patient denies dysuria, no hematuria, no nocturia, denies flank pain. Overactive bladder problems but is not taking any medication currently.  Musculoskeletal:         General: Normal range of motion.      Cervical back: Normal range of motion.      Comments: History of L1 and L4 previous compression fractures which is currently stable.  She does get some radicular pain down the legs on occasion .  Occasionally gets radicular pain down the legs and does use a cane but previously had to use a walker.  Age-related arthritis in the shoulders hips and knees.  She does state that occasionally her right knee feels weak and that she wears a knee brace.   Skin:     General: Skin is warm and dry.      Findings: Bruising present.      Comments: Patient was working in the yard and has a small cut on her base of the right thumb and also has some ecchymotic bruising of the left thenar eminence.  This was cleansed with Betadine and a Bactroban ointment with Band-Aid was applied.  No signs of infection.   Neurological:      General: No focal deficit present.      Mental Status: She is alert and oriented to person, place, and time.  Mental status is at baseline.      Sensory: Sensory deficit present.      Coordination: Coordination abnormal.      Comments: Occasional paresthesias lower extremities and some peripheral neuropathy in the feet from lumbosacral disc disease and previous compression fractures.  No focal neurosensory deficits are noted.  Restricted coordination and gait and uses a cane.  Decreased muscle strength upper and lower extremities.   Psychiatric:         Mood and Affect: Mood normal.         Behavior: Behavior normal.         Thought Content: Thought content normal.         Judgment: Judgment normal.      Comments: Patient has normal mood and affect.  Thought content and judgment are stable.  No signs of vascular dementia.  Behavior is normal.     PLAN : Patient is an 83-year-old female who was evaluated today for recheck on blood sugar and Coumadin INR.  She also has hypertension and visual problems which are monitored by retinal specialist.  Blood sugar today was 104 and for some reason her Coumadin level was down to 1.1%.  She states she is taking her medication as directed.  Blood pressure and other vitals were stable and aside from arthritic aches and pains she is stable.   She will follow-up in 2 months for recheck.  I did reinforce for her to make sure she does not forget to take her Coumadin on a daily basis.    Problem List Items Addressed This Visit       Chronic low back pain    Dizziness    Hyperglycemia    Relevant Orders    POCT fingerstick glucose manually resulted    Pulmonary embolus (Multi)    Closed compression fracture of body of L1 vertebra (Multi) - Primary    Closed compression fracture of fourth lumbar vertebra (Multi)    Anticoagulated on Coumadin    Relevant Orders    POCT INR manually resulted            David Granados DO

## 2024-07-02 DIAGNOSIS — S32.040A CLOSED COMPRESSION FRACTURE OF L4 VERTEBRA, INITIAL ENCOUNTER (MULTI): ICD-10-CM

## 2024-07-02 DIAGNOSIS — G89.29 CHRONIC LOW BACK PAIN WITH SCIATICA, SCIATICA LATERALITY UNSPECIFIED, UNSPECIFIED BACK PAIN LATERALITY: ICD-10-CM

## 2024-07-02 DIAGNOSIS — M80.00XD AGE-RELATED OSTEOPOROSIS WITH CURRENT PATHOLOGICAL FRACTURE WITH ROUTINE HEALING, SUBSEQUENT ENCOUNTER: ICD-10-CM

## 2024-07-02 DIAGNOSIS — M54.40 CHRONIC LOW BACK PAIN WITH SCIATICA, SCIATICA LATERALITY UNSPECIFIED, UNSPECIFIED BACK PAIN LATERALITY: ICD-10-CM

## 2024-07-02 DIAGNOSIS — S32.010A CLOSED COMPRESSION FRACTURE OF BODY OF L1 VERTEBRA (MULTI): ICD-10-CM

## 2024-07-03 RX ORDER — RISEDRONATE SODIUM 35 MG/1
35 TABLET, FILM COATED ORAL
Qty: 12 TABLET | Refills: 3 | Status: SHIPPED | OUTPATIENT
Start: 2024-07-07

## 2024-08-05 ENCOUNTER — HOSPITAL ENCOUNTER (OUTPATIENT)
Dept: RADIOLOGY | Facility: HOSPITAL | Age: 84
Discharge: HOME | End: 2024-08-05
Payer: MEDICARE

## 2024-08-05 DIAGNOSIS — E04.1 NODULE OF LEFT LOBE OF THYROID GLAND: ICD-10-CM

## 2024-08-05 DIAGNOSIS — R13.10 DYSPHAGIA, UNSPECIFIED TYPE: ICD-10-CM

## 2024-08-05 PROCEDURE — 92611 MOTION FLUOROSCOPY/SWALLOW: CPT | Mod: GN | Performed by: SPEECH-LANGUAGE PATHOLOGIST

## 2024-08-05 PROCEDURE — 74230 X-RAY XM SWLNG FUNCJ C+: CPT

## 2024-08-05 PROCEDURE — 74230 X-RAY XM SWLNG FUNCJ C+: CPT | Performed by: STUDENT IN AN ORGANIZED HEALTH CARE EDUCATION/TRAINING PROGRAM

## 2024-08-05 PROCEDURE — 76536 US EXAM OF HEAD AND NECK: CPT | Performed by: STUDENT IN AN ORGANIZED HEALTH CARE EDUCATION/TRAINING PROGRAM

## 2024-08-05 PROCEDURE — 76536 US EXAM OF HEAD AND NECK: CPT

## 2024-08-05 PROCEDURE — 2500000005 HC RX 250 GENERAL PHARMACY W/O HCPCS: Performed by: STUDENT IN AN ORGANIZED HEALTH CARE EDUCATION/TRAINING PROGRAM

## 2024-08-05 NOTE — PROCEDURES
"Speech-Language Pathology    Outpatient Modified Barium Swallow Study    Patient Name: Janice Avalos  MRN: [id number]  : 1940  Today's Date: 24         Modified Barium Swallow Study completed. Informed verbal consent obtained prior to completion of exam. Trials of thin (5mL, 20 mL hold/swallow + consecutive sips), nectar/mildly thick liquid (20 mL hold/swallow + consecutive sips by cup), puree, regular solids were given.   Lateral and a-p views obtained.   Pt upright in chair.   C-arm utilized for this study.      SLP: Tootie Maki CCC-SLP   Contact info: secure chat please    FINAL SPEECH RECOMMENDATIONS    DIET RECOMMENDATIONS:   Oropharyngeal swallow is intact for regular textured diet and thin liquids.    However, esophageal dysphagia noted on a-p view, retained barium noted in upper esophagus.  *Suggest further testing Upper GI per consult with Radiologist.         STRATEGIES:  -General aspiration precautions.   -Fully upright for meals/meds.  -Small bites, slow rate of PO intake.   -Consult with physician to crush meds if needed. Be aware that some meds are NOT \"crushable\" as we discussed.   -Consider several smaller meals during the day instead of 3 large meals to maintain nutrition.     Additional consult suggested:  GI  Repeat this study/ dc plan: As needed.     Mechanics of the Swallow Summary:  ORAL PHASE:  Lip Closure - No labial escape/anterior loss of bolus   Tongue Control During Bolus Hold - Cohesive bolus between tongue to palatal seal   Bolus prep/mastication - Timely and efficient mastication skills   Bolus transport/lingual motion - Brisk tongue motion for A-P movement of the bolus   Oral residue - Complete oral clearance     PHARYNGEAL PHASE:  Initiation of pharyngeal swallow - Bolus head at posterior angle of ramus   Soft palate elevation - No bolus between soft palate/pharyngeal wall   Laryngeal elevation - Complete superior movement of thyroid cartilage with contact of " arytenoids to epiglottic petiole   Anterior hyoid excursion - Complete anterior movement   Epiglottic movement - Complete inversion    Laryngeal vestibule closure - Complete - no air/contrast in laryngeal vestibule   Pharyngeal stripping wave - Complete  Pharyngeal contraction (A/P view) - Not tested       Pharyngoesophageal segment opening - Complete distension and complete duration/no obstruction of flow of bolus   Tongue base retraction - No bolus between tongue base and posterior pharyngeal wall   Pharyngeal residue - Complete pharyngeal clearance     ESOPHAGEAL PHASE:  Esophageal clearance - Esophageal retention     SLP Impressions with Severity Rating:   Intact oral and pharyngeal phases of the swallow.   No significant oral or pharyngeal residues after the swallow.    No laryngeal penetration.  No aspiration.   *Retained barium noted in upper esophagus on later and a-p views.      Education Provided: SLP has provided Ms. Avalos and Dr. Manning, with the results and recommendations of this session.  Video images of this study were reviewed with pt so that she might have a better understanding of the oropharyngeal anatomy and swallow function, and the concern for esophageal dysfunction, need for follow-up with Dr. Manning for orders to complete further testing and possible GI consult.         OUTCOME MEASURES:  Eating Assessment Tool (EAT-10)   0=No problem, 1=Mild problem, 2=Mild to moderate problem, 3=Moderate problem, 4=Severe problem  My swallowing problem has caused me to lost weight = 0, pt reports purposeful weight loss   My swallowing problem interferes with my ability to go out for meals =4  Swallowing liquids takes extra effort = 3  Swallowing solids takes extra effort = 3  Swallowing pills takes extra effort = 3  Swallowing is painful = 0  The pleasure of eating is affected by my swallowing = 4  When I swallow food sticks in my throat = 4  I cough when I eat = 1  Swallowing is stressful =  4  TOTAL SCORE OF  26.  A total score of 3 or above may indicate difficulty with swallowing safely and/or efficiently    Rosenbek's Penetration Aspiration Scale  Thin Liquids: 1. NO ASPIRATION & NO PENETRATION - no aspiration, contrast does not enter airway  [place] Thick Liquids: 1. NO ASPIRATION & NO PENETRATION - no aspiration, contrast does not enter airway  Puree: 1. NO ASPIRATION & NO PENETRATION - no aspiration, contrast does not enter airway  Solids: 1. NO ASPIRATION & NO PENETRATION - no aspiration, contrast does not enter airway    Reason for Referral:  complaint of persistent dysphagia and globus sensation per Dr. Manning's note of 5/8/24 (telephone encounter) and pt report today.   Patient Hx:   thyroid nodules, growth of left-side nodule on ultrasound in July 2023.    Heartburn  Hiatal hernia.  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   Respiratory Status: Room air  Current diet: Eats all foods/liquids, no texture restrictions.

## 2024-08-27 ENCOUNTER — TELEPHONE (OUTPATIENT)
Dept: OTOLARYNGOLOGY | Facility: CLINIC | Age: 84
End: 2024-08-27
Payer: MEDICARE

## 2024-08-27 DIAGNOSIS — E04.1 NODULE OF LEFT LOBE OF THYROID GLAND: Primary | ICD-10-CM

## 2024-08-27 DIAGNOSIS — R13.10 DYSPHAGIA, UNSPECIFIED TYPE: Primary | ICD-10-CM

## 2024-08-27 NOTE — TELEPHONE ENCOUNTER
Results of her recent modified barium swallow.  No dysphagia noted at the pharynx or larynx level.  However, there is concern for possible diverticulum of the esophagus.  Recommendation is for esophagram.  Patient was amenable to this which will be ordered.  I will call her with the results.

## 2024-09-09 ENCOUNTER — APPOINTMENT (OUTPATIENT)
Dept: PRIMARY CARE | Facility: CLINIC | Age: 84
End: 2024-09-09
Payer: MEDICARE

## 2024-09-09 VITALS
DIASTOLIC BLOOD PRESSURE: 69 MMHG | WEIGHT: 154 LBS | BODY MASS INDEX: 24.17 KG/M2 | RESPIRATION RATE: 18 BRPM | SYSTOLIC BLOOD PRESSURE: 128 MMHG | HEIGHT: 67 IN | TEMPERATURE: 98.1 F | HEART RATE: 63 BPM | OXYGEN SATURATION: 95 %

## 2024-09-09 DIAGNOSIS — R53.81 MALAISE AND FATIGUE: ICD-10-CM

## 2024-09-09 DIAGNOSIS — Z79.01 ANTICOAGULATED ON COUMADIN: ICD-10-CM

## 2024-09-09 DIAGNOSIS — R73.9 HYPERGLYCEMIA: ICD-10-CM

## 2024-09-09 DIAGNOSIS — I10 PRIMARY HYPERTENSION: ICD-10-CM

## 2024-09-09 DIAGNOSIS — R53.83 MALAISE AND FATIGUE: ICD-10-CM
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
## 2024-09-09 DIAGNOSIS — E78.5 DYSLIPIDEMIA: ICD-10-CM

## 2024-09-09 DIAGNOSIS — R42 DIZZY SPELLS: ICD-10-CM

## 2024-09-09 LAB
ALBUMIN SERPL BCP-MCNC: 3.8 G/DL (ref 3.4–5)
ALP SERPL-CCNC: 45 U/L (ref 33–136)
ALT SERPL W P-5'-P-CCNC: 11 U/L (ref 7–45)
ANION GAP SERPL CALC-SCNC: 11 MMOL/L (ref 10–20)
AST SERPL W P-5'-P-CCNC: 11 U/L (ref 9–39)
BILIRUB SERPL-MCNC: 0.7 MG/DL (ref 0–1.2)
BUN SERPL-MCNC: 17 MG/DL (ref 6–23)
CALCIUM SERPL-MCNC: 9.1 MG/DL (ref 8.6–10.6)
CHLORIDE SERPL-SCNC: 105 MMOL/L (ref 98–107)
CHOLEST SERPL-MCNC: 214 MG/DL (ref 0–199)
CHOLESTEROL/HDL RATIO: 3.6
CO2 SERPL-SCNC: 28 MMOL/L (ref 21–32)
CREAT SERPL-MCNC: 0.66 MG/DL (ref 0.5–1.05)
EGFRCR SERPLBLD CKD-EPI 2021: 88 ML/MIN/1.73M*2
ERYTHROCYTE [DISTWIDTH] IN BLOOD BY AUTOMATED COUNT: 14.8 % (ref 11.5–14.5)
GLUCOSE SERPL-MCNC: 150 MG/DL (ref 74–99)
HCT VFR BLD AUTO: 42.3 % (ref 36–46)
HDLC SERPL-MCNC: 59.7 MG/DL
HGB BLD-MCNC: 13.4 G/DL (ref 12–16)
LDLC SERPL CALC-MCNC: 135 MG/DL
MCH RBC QN AUTO: 30.1 PG (ref 26–34)
MCHC RBC AUTO-ENTMCNC: 31.7 G/DL (ref 32–36)
MCV RBC AUTO: 95 FL (ref 80–100)
NON HDL CHOLESTEROL: 154 MG/DL (ref 0–149)
NRBC BLD-RTO: 0 /100 WBCS (ref 0–0)
PLATELET # BLD AUTO: 234 X10*3/UL (ref 150–450)
POC FINGERSTICK BLOOD GLUCOSE: 157 MG/DL (ref 70–100)
POC INR: 1.5 (ref 0.9–1.1)
POTASSIUM SERPL-SCNC: 3.8 MMOL/L (ref 3.5–5.3)
PROT SERPL-MCNC: 6.6 G/DL (ref 6.4–8.2)
RBC # BLD AUTO: 4.45 X10*6/UL (ref 4–5.2)
SODIUM SERPL-SCNC: 140 MMOL/L (ref 136–145)
TRIGL SERPL-MCNC: 96 MG/DL (ref 0–149)
TSH SERPL-ACNC: 1.1 MIU/L (ref 0.44–3.98)
VLDL: 19 MG/DL (ref 0–40)
WBC # BLD AUTO: 4.4 X10*3/UL (ref 4.4–11.3)

## 2024-09-09 PROCEDURE — 1123F ACP DISCUSS/DSCN MKR DOCD: CPT | Performed by: FAMILY MEDICINE

## 2024-09-09 PROCEDURE — 1158F ADVNC CARE PLAN TLK DOCD: CPT | Performed by: FAMILY MEDICINE

## 2024-09-09 PROCEDURE — 99214 OFFICE O/P EST MOD 30 MIN: CPT | Performed by: FAMILY MEDICINE

## 2024-09-09 PROCEDURE — 1157F ADVNC CARE PLAN IN RCRD: CPT | Performed by: FAMILY MEDICINE

## 2024-09-09 PROCEDURE — 3078F DIAST BP <80 MM HG: CPT | Performed by: FAMILY MEDICINE

## 2024-09-09 PROCEDURE — 1036F TOBACCO NON-USER: CPT | Performed by: FAMILY MEDICINE

## 2024-09-09 PROCEDURE — 1159F MED LIST DOCD IN RCRD: CPT | Performed by: FAMILY MEDICINE

## 2024-09-09 PROCEDURE — 80061 LIPID PANEL: CPT

## 2024-09-09 PROCEDURE — 84443 ASSAY THYROID STIM HORMONE: CPT

## 2024-09-09 PROCEDURE — 82962 GLUCOSE BLOOD TEST: CPT | Performed by: FAMILY MEDICINE

## 2024-09-09 PROCEDURE — 1126F AMNT PAIN NOTED NONE PRSNT: CPT | Performed by: FAMILY MEDICINE

## 2024-09-09 PROCEDURE — 80053 COMPREHEN METABOLIC PANEL: CPT

## 2024-09-09 PROCEDURE — 85610 PROTHROMBIN TIME: CPT | Performed by: FAMILY MEDICINE

## 2024-09-09 PROCEDURE — 1160F RVW MEDS BY RX/DR IN RCRD: CPT | Performed by: FAMILY MEDICINE

## 2024-09-09 PROCEDURE — 85027 COMPLETE CBC AUTOMATED: CPT

## 2024-09-09 PROCEDURE — 3074F SYST BP LT 130 MM HG: CPT | Performed by: FAMILY MEDICINE

## 2024-09-09 RX ORDER — MECLIZINE HYDROCHLORIDE 25 MG/1
25 TABLET ORAL 3 TIMES DAILY PRN
Qty: 60 TABLET | Refills: 3 | Status: SHIPPED | OUTPATIENT
Start: 2024-09-09 | End: 2025-09-09

## 2024-09-09 ASSESSMENT — PATIENT HEALTH QUESTIONNAIRE - PHQ9
SUM OF ALL RESPONSES TO PHQ9 QUESTIONS 1 AND 2: 0
1. LITTLE INTEREST OR PLEASURE IN DOING THINGS: NOT AT ALL
1. LITTLE INTEREST OR PLEASURE IN DOING THINGS: NOT AT ALL
2. FEELING DOWN, DEPRESSED OR HOPELESS: NOT AT ALL
2. FEELING DOWN, DEPRESSED OR HOPELESS: NEARLY EVERY DAY
SUM OF ALL RESPONSES TO PHQ9 QUESTIONS 1 AND 2: 3

## 2024-09-09 ASSESSMENT — PAIN SCALES - GENERAL: PAINLEVEL: 0-NO PAIN

## 2024-09-09 NOTE — RESULT ENCOUNTER NOTE
Red and white blood cell counts are normal,    cholesterol was borderline at 214       triglycerides are normal at 96     her sugar was up a little bit at 150     she has to watch the diet better.     Kidney and liver function are normal      thyroid function is normal        just continue to try to watch the diet.    Other blood work is stable.

## 2024-09-09 NOTE — PROGRESS NOTES
Subjective   Janice Avalos is a 83 y.o. female who presents for Follow-up (Follow up visit for Coumadin check).    HPI  : Patient is a 83-year-old female who is in for recheck on blood sugar, complete blood work and also recheck blood pressure and vitals.  She states she had a fall 2 months ago at a restaurant and bruised her forehead but did not go to the emergency room or seek medical attention.  She did put ice on it and it seemed to improve and she has no problems today related to the fall injury.  Her eyesight is poor and she follows with a retinal specialist and she is on medication for her macular degeneration.  She will have her blood work checked today and further recommendations after review of the blood results.    Objective  : ROS : 10 systems were reviewed and the information is included in the HPI and no additional review of systems is indicated.    Physical Exam  Vitals and nursing note reviewed.   Constitutional:       Appearance: Normal appearance.      Comments: Patient is alert and oriented x3.   No acute distress   HENT:      Head: Normocephalic.      Right Ear: Tympanic membrane and external ear normal.      Left Ear: Tympanic membrane and external ear normal.      Ears:      Comments: Ears are patent bilaterally and TMs are clear.     Nose: Nose normal.      Mouth/Throat:      Mouth: Mucous membranes are moist.      Pharynx: Oropharynx is clear.      Comments: Mouth is moist, tongue is midline.  No posterior pharyngeal erythema.  Eyes:      Extraocular Movements: Extraocular movements intact.      Conjunctiva/sclera: Conjunctivae normal.      Pupils: Pupils are equal, round, and reactive to light.      Comments: Patient wears dark  , glasses due to retinal problems.  She does follow with Dr Garber , retinal specialist.   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    Neck:      Comments: Left Thyromegaly and had biopsy done.   Cardiovascular:      Rate and Rhythm: Normal rate and regular rhythm.      Pulses: Normal pulses.       Heart sounds: Normal heart sounds.      Comments: Patient denies chest pain and no palpitations.  Heart rhythm is stable S1 and S2 are noted.   Compression stockings.   Pulmonary:      Effort: Pulmonary effort is normal.      Breath sounds: Normal breath sounds.      Comments: Patient denies any coughing or wheezing.  Lungs are clear to auscultation.    Abdominal:      General: Bowel sounds are normal.      Palpations: Abdomen is soft.      Comments: Abdomen is soft and non tender.  No flank tenderness.  No suprapubic pain.  Positive bowel sounds .  No abdominal guarding and no rebound tenderness.   Genitourinary:     Comments: Bladder problems and follows with Urology. Citlali Gonzales NP.  Musculoskeletal:         General: Tenderness present. Normal range of motion.      Cervical back: Tenderness present.      Comments: Advanced arthritis all her joints.  Using a cane or a  walker.  Chronic lumbosacral degenerative disc disease and radiculopathy.  Osteoarthritis of both hips and knees.  Also has some peripheral neuropathy.   Skin:     General: Skin is warm.      Findings: Bruising (Easy bruising.) present.      Comments: Bruising from Coumadin.    Neurological:      General: No focal deficit present.      Mental Status: She is alert and oriented to person, place, and time. Mental status is at baseline.      Sensory: Sensory deficit present.      Coordination: Coordination abnormal.      Gait: Gait abnormal.      Deep Tendon Reflexes: Reflexes abnormal.      Comments: Patient has unsteady gait and poor coordination due to arthritis in her joints.  She also has a high fall risk due to her arthralgia and lumbosacral disc disease.  She has fallen several times and had scans done in the past.  Her most recent fall was July 7, 2024, but she did not go to the hospital.  She also suffers with chronic dizziness.  She does use a cane or a walker and she has very poor eyesight.  She follows with a retinal specialist.  She  does need to have assistance with her if she goes to a strange environment that she is not familiar with like a restaurant or to dinner in the evening.  I did try to reinforce this with her today.   Psychiatric:         Mood and Affect: Mood normal.         Behavior: Behavior normal.         Thought Content: Thought content normal.         Judgment: Judgment normal.      Comments: Patient has normal mood and affect.  Thought content and judgment are stable.  No signs of vascular dementia.  Behavior is normal.     PLAN : Patient is an 83-year-old female who was evaluated today for several problems and concerns.  She had fallen on July 7 when they were out to dinner at a restaurant and did bruise to top of her forehead but did not go to the ER or seek any medical attention.  She states it did improve with applying ice and she had no loss of consciousness.  She states that she fell because her eyesight is so poor she could not see the step and tripped.  She is stable today and in no acute distress.  Her blood sugar was somewhat elevated at 157 and her Coumadin INR was low at 1.5%.  She will be notified of her other blood results and 3 days and further recommendations will be made at that time.  She knows that she should use her cane at all times and she should have some assistance if she goes to a restaurant or somewhere that is not familiar to her.  She does have balance issues and has been through physical therapy in the past.  She will follow-up in 3 or 4 months or sooner as needed pending the blood work results.    Problem List Items Addressed This Visit       HTN (hypertension)    Relevant Orders    CBC    Comprehensive Metabolic Panel    Lipid Panel    Thyroid Stimulating Hormone    Hyperglycemia    Relevant Orders    CBC    Comprehensive Metabolic Panel    Lipid Panel    Thyroid Stimulating Hormone    POCT fingerstick glucose manually resulted    Anticoagulated on Coumadin    Relevant Orders    POCT INR manually  resulted     Other Visit Diagnoses       Dyslipidemia        Relevant Orders    CBC    Comprehensive Metabolic Panel    Lipid Panel    Thyroid Stimulating Hormone    Malaise and fatigue        Relevant Orders    CBC    Comprehensive Metabolic Panel    Lipid Panel    Thyroid Stimulating Hormone                 David Granados DO

## 2024-09-11 ENCOUNTER — HOSPITAL ENCOUNTER (OUTPATIENT)
Dept: RADIOLOGY | Facility: HOSPITAL | Age: 84
Discharge: HOME | End: 2024-09-11
Payer: MEDICARE

## 2024-09-11 DIAGNOSIS — R13.10 DYSPHAGIA, UNSPECIFIED TYPE: ICD-10-CM

## 2024-09-11 PROCEDURE — 74221 X-RAY XM ESOPHAGUS 2CNTRST: CPT | Performed by: STUDENT IN AN ORGANIZED HEALTH CARE EDUCATION/TRAINING PROGRAM

## 2024-09-11 PROCEDURE — 74220 X-RAY XM ESOPHAGUS 1CNTRST: CPT

## 2024-09-11 PROCEDURE — 2500000005 HC RX 250 GENERAL PHARMACY W/O HCPCS: Performed by: STUDENT IN AN ORGANIZED HEALTH CARE EDUCATION/TRAINING PROGRAM

## 2024-09-13 ENCOUNTER — TELEPHONE (OUTPATIENT)
Dept: OTOLARYNGOLOGY | Facility: CLINIC | Age: 84
End: 2024-09-13
Payer: MEDICARE

## 2024-09-20 ENCOUNTER — TELEPHONE (OUTPATIENT)
Dept: OTOLARYNGOLOGY | Facility: CLINIC | Age: 84
End: 2024-09-20
Payer: MEDICARE

## 2024-09-20 NOTE — TELEPHONE ENCOUNTER
Discussed results with patient.  No diverticula is concerned about on modified barium swallow.  Known hiatal hernia present.  At this time no further workup for dysphagia/globus recommended.

## 2024-10-01 DIAGNOSIS — M19.90 ARTHRITIS: Primary | ICD-10-CM

## 2024-10-01 RX ORDER — PREDNISONE 10 MG/1
10 TABLET ORAL 2 TIMES DAILY
Qty: 10 TABLET | Refills: 0 | Status: SHIPPED | OUTPATIENT
Start: 2024-10-01 | End: 2024-10-06

## 2024-10-14 DIAGNOSIS — T78.40XA ALLERGIC REACTION, INITIAL ENCOUNTER: ICD-10-CM

## 2024-10-14 RX ORDER — METHYLPREDNISOLONE 4 MG/1
TABLET ORAL
Qty: 21 TABLET | Refills: 0 | Status: SHIPPED | OUTPATIENT
Start: 2024-10-14 | End: 2024-10-21

## 2024-11-04 ENCOUNTER — APPOINTMENT (OUTPATIENT)
Dept: PRIMARY CARE | Facility: CLINIC | Age: 84
End: 2024-11-04
Payer: MEDICARE

## 2024-11-04 VITALS
RESPIRATION RATE: 16 BRPM | SYSTOLIC BLOOD PRESSURE: 148 MMHG | TEMPERATURE: 97.7 F | HEIGHT: 67 IN | BODY MASS INDEX: 24.01 KG/M2 | HEART RATE: 61 BPM | DIASTOLIC BLOOD PRESSURE: 75 MMHG | WEIGHT: 153 LBS | OXYGEN SATURATION: 94 %

## 2024-11-04 DIAGNOSIS — S32.010A CLOSED COMPRESSION FRACTURE OF BODY OF L1 VERTEBRA (MULTI): ICD-10-CM

## 2024-11-04 DIAGNOSIS — I82.5Z3 CHRONIC DEEP VEIN THROMBOSIS (DVT) OF DISTAL VEIN OF BOTH LOWER EXTREMITIES (MULTI): ICD-10-CM

## 2024-11-04 DIAGNOSIS — I27.82 CHRONIC PULMONARY EMBOLISM WITHOUT ACUTE COR PULMONALE, UNSPECIFIED PULMONARY EMBOLISM TYPE (MULTI): ICD-10-CM

## 2024-11-04 DIAGNOSIS — R82.90 ABNORMAL URINALYSIS: ICD-10-CM

## 2024-11-04 DIAGNOSIS — H35.3231 EXUDATIVE AGE-RELATED MACULAR DEGENERATION, BILATERAL, WITH ACTIVE CHOROIDAL NEOVASCULARIZATION: ICD-10-CM

## 2024-11-04 DIAGNOSIS — R47.02 DYSPHASIA: ICD-10-CM

## 2024-11-04 DIAGNOSIS — Z79.01 ANTICOAGULATED ON COUMADIN: ICD-10-CM

## 2024-11-04 DIAGNOSIS — M51.9 LUMBOSACRAL DISC DISEASE: ICD-10-CM

## 2024-11-04 DIAGNOSIS — E01.0 THYROMEGALY: ICD-10-CM

## 2024-11-04 DIAGNOSIS — N39.0 RECURRENT UTI: Primary | ICD-10-CM

## 2024-11-04 DIAGNOSIS — R42 DIZZY SPELLS: ICD-10-CM

## 2024-11-04 DIAGNOSIS — Z88.9 MULTIPLE ALLERGIES: ICD-10-CM

## 2024-11-04 DIAGNOSIS — S32.040S CLOSED COMPRESSION FRACTURE OF L4 VERTEBRA, SEQUELA: ICD-10-CM

## 2024-11-04 LAB
APPEARANCE UR: CLEAR
BILIRUB UR QL STRIP: NEGATIVE
COLOR UR: YELLOW
GLUCOSE UR STRIP-MCNC: NEGATIVE MG/DL
HGB UR QL STRIP: ABNORMAL
KETONES UR STRIP-MCNC: NEGATIVE MG/DL
LEUKOCYTE ESTERASE UR QL STRIP: ABNORMAL
NITRITE UR QL STRIP: NEGATIVE
PH UR STRIP: 7.5 [PH]
PROT UR STRIP-MCNC: NEGATIVE MG/DL
SP GR UR STRIP.AUTO: 1.02
UROBILINOGEN UR STRIP-ACNC: 0.2 E.U./DL

## 2024-11-04 PROCEDURE — 1126F AMNT PAIN NOTED NONE PRSNT: CPT | Performed by: FAMILY MEDICINE

## 2024-11-04 PROCEDURE — 1157F ADVNC CARE PLAN IN RCRD: CPT | Performed by: FAMILY MEDICINE

## 2024-11-04 PROCEDURE — 99214 OFFICE O/P EST MOD 30 MIN: CPT | Performed by: FAMILY MEDICINE

## 2024-11-04 PROCEDURE — 3077F SYST BP >= 140 MM HG: CPT | Performed by: FAMILY MEDICINE

## 2024-11-04 PROCEDURE — 3078F DIAST BP <80 MM HG: CPT | Performed by: FAMILY MEDICINE

## 2024-11-04 PROCEDURE — 1036F TOBACCO NON-USER: CPT | Performed by: FAMILY MEDICINE

## 2024-11-04 PROCEDURE — 81003 URINALYSIS AUTO W/O SCOPE: CPT | Performed by: FAMILY MEDICINE

## 2024-11-04 PROCEDURE — 1123F ACP DISCUSS/DSCN MKR DOCD: CPT | Performed by: FAMILY MEDICINE

## 2024-11-04 PROCEDURE — 1159F MED LIST DOCD IN RCRD: CPT | Performed by: FAMILY MEDICINE

## 2024-11-04 PROCEDURE — 1160F RVW MEDS BY RX/DR IN RCRD: CPT | Performed by: FAMILY MEDICINE

## 2024-11-04 PROCEDURE — 87086 URINE CULTURE/COLONY COUNT: CPT

## 2024-11-04 RX ORDER — CIPROFLOXACIN 500 MG/1
500 TABLET ORAL 2 TIMES DAILY
Qty: 20 TABLET | Refills: 0 | Status: SHIPPED | OUTPATIENT
Start: 2024-11-04 | End: 2024-11-14

## 2024-11-04 ASSESSMENT — PATIENT HEALTH QUESTIONNAIRE - PHQ9
2. FEELING DOWN, DEPRESSED OR HOPELESS: NOT AT ALL
1. LITTLE INTEREST OR PLEASURE IN DOING THINGS: NOT AT ALL
SUM OF ALL RESPONSES TO PHQ9 QUESTIONS 1 AND 2: 0

## 2024-11-04 ASSESSMENT — PAIN SCALES - GENERAL: PAINLEVEL_OUTOF10: 0-NO PAIN

## 2024-11-04 NOTE — PROGRESS NOTES
Subjective   Janice Avalos is a 84 y.o. female who presents for Follow-up (Follow up visit following visit to ENT (Dr. Manning)).    HPI  : Patient is an 84-year-old female who is complaining of possible urinary tract infection and dysuria.  She also wanted to review some of the results from her recent thyroid biopsy and she did have a upper GI and esophagram due to enlarged thyroid gland.  Patient will give us a urine specimen today and I will also check her sugar and Coumadin INR.  She also follows closely with her retinal specialist since she has elevated pressures in her eyes and also macular degeneration.    Objective  : ROS : 10 systems were reviewed and the information is included in the HPI and no additional review of systems is indicated.    Physical Exam  Vitals and nursing note reviewed.   Constitutional:       Appearance: Normal appearance.      Comments: Patient is alert and oriented x3.   No acute distress   HENT:      Head: Normocephalic.      Right Ear: Tympanic membrane and external ear normal.      Left Ear: Tympanic membrane and external ear normal.      Ears:      Comments: Ears are patent bilaterally and TMs are clear.     Nose: Nose normal.      Mouth/Throat:      Mouth: Mucous membranes are moist.      Pharynx: Oropharynx is clear.      Comments: Mouth is moist, tongue is midline.  No posterior pharyngeal erythema.  Eyes:      Extraocular Movements: Extraocular movements intact.      Conjunctiva/sclera: Conjunctivae normal.      Pupils: Pupils are equal, round, and reactive to light.      Comments: Macular degeneration and glaucoma.  Does follow with a retinal specialist.      Neck:      Comments: Had thyroid biopsy and still with thyromegally.  Recently had an upper GI and esophagram to evaluate her dysphagia problem due to enlarged thyroid gland.  Cardiovascular:      Rate and Rhythm: Normal rate and regular rhythm.      Pulses: Normal pulses.      Heart sounds: Normal heart sounds.       Comments: Patient denies chest pain and no palpitations.  Heart rhythm is stable S1 and S2 are noted.  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  Pulmonary:      Effort: Pulmonary effort is normal.      Comments: Patient denies any coughing or wheezing.  Lungs are clear to auscultation.  Patient also has a previous history of pulmonary emboli and is on Coumadin.  Abdominal:      General: Bowel sounds are normal.      Palpations: Abdomen is soft.      Comments: Patient had upper GI and esophagram which does show  a hiatal hernia.. Reviewed results  with patients.   Patient was concerned about trouble swallowing which she really thinks is from her enlarged thyroid gland.   Genitourinary:     Comments: Patient has dysuria and frequency and we are checking a urine today for infection.  Musculoskeletal:         General: Tenderness present. Normal range of motion.      Cervical back: Normal range of motion.      Comments: Previous lumbar compression fractures.   Chronic lumbar back pains.  Age-related arthritis in the joints.  Restriction of motion cervical and lumbar spines due to arthritis and muscle spasm.   Skin:     General: Skin is warm.      Comments: There is no bruising, no erythema, no skin lesions noted, no rashes.  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   Neurological:      General: No focal deficit present.      Mental Status: She is alert and oriented to person, place, and time. Mental status is at baseline.      Sensory: Sensory deficit present.      Coordination: Coordination abnormal.      Comments: Patient does have some neuropathy and paresthesias in her lower extremities.  Ambulates with a cane and has to be cautious with coordination and balance.   Psychiatric:         Behavior: Behavior normal.         Thought Content: Thought content normal.         Judgment: Judgment normal.      Comments: Patient has stable ,  mood and  affect.  Thought content and judgment are stable.  No signs of vascular dementia.      PLAN : Patient is a 84-year-old female who was evaluated today for possible urinary tract infection and also to review her recent upper GI and esophagram that was ordered by ENT.  The urine shows a pH of 7.5, specific gravity 1.020, moderate leukocytes, negative protein, trace of lysed blood, negative glucose.  Her blood sugar was 99 and her Coumadin INR was 1.8%.  I did prescribe Cipro for her 500 mg twice daily since she felt that helped in the past when she had a severe UTI infection.  She otherwise is stable and currently needs to discuss the enlarged thyroid gland with ENT.  It seems that due to her age nobody wants to currently remove the enlarged thyroid gland.  This will have to be discussed with the ENT surgeons.    Problem List Items Addressed This Visit    None           David Granados, DO

## 2024-11-06 LAB — BACTERIA UR CULT: NORMAL

## 2024-12-17 DIAGNOSIS — Z86.718 HISTORY OF DVT (DEEP VEIN THROMBOSIS): ICD-10-CM

## 2024-12-17 RX ORDER — WARFARIN SODIUM 5 MG/1
TABLET ORAL
Qty: 52 TABLET | Refills: 3 | Status: SHIPPED | OUTPATIENT
Start: 2024-12-17

## 2025-01-06 ENCOUNTER — APPOINTMENT (OUTPATIENT)
Dept: PRIMARY CARE | Facility: CLINIC | Age: 85
End: 2025-01-06
Payer: MEDICARE

## 2025-02-26 ENCOUNTER — OFFICE VISIT (OUTPATIENT)
Dept: PRIMARY CARE | Facility: CLINIC | Age: 85
End: 2025-02-26
Payer: MEDICARE

## 2025-02-26 VITALS
HEIGHT: 67 IN | RESPIRATION RATE: 16 BRPM | BODY MASS INDEX: 22.13 KG/M2 | OXYGEN SATURATION: 95 % | SYSTOLIC BLOOD PRESSURE: 160 MMHG | TEMPERATURE: 100.9 F | WEIGHT: 141 LBS | DIASTOLIC BLOOD PRESSURE: 89 MMHG | HEART RATE: 80 BPM

## 2025-02-26 DIAGNOSIS — M54.50 CHRONIC MIDLINE LOW BACK PAIN WITHOUT SCIATICA: ICD-10-CM

## 2025-02-26 DIAGNOSIS — Z79.01 ANTICOAGULATED ON COUMADIN: ICD-10-CM

## 2025-02-26 DIAGNOSIS — R60.0 SUBMANDIBULAR GLAND SWELLING: Primary | ICD-10-CM

## 2025-02-26 DIAGNOSIS — G89.29 CHRONIC MIDLINE LOW BACK PAIN WITHOUT SCIATICA: ICD-10-CM

## 2025-02-26 DIAGNOSIS — S32.010A CLOSED COMPRESSION FRACTURE OF BODY OF L1 VERTEBRA (MULTI): ICD-10-CM

## 2025-02-26 DIAGNOSIS — B96.89 ACUTE BACTERIAL SIALADENITIS: ICD-10-CM

## 2025-02-26 DIAGNOSIS — I82.5Z3 CHRONIC DEEP VEIN THROMBOSIS (DVT) OF DISTAL VEIN OF BOTH LOWER EXTREMITIES (MULTI): ICD-10-CM

## 2025-02-26 DIAGNOSIS — R73.9 HYPERGLYCEMIA: ICD-10-CM

## 2025-02-26 DIAGNOSIS — K11.21 ACUTE BACTERIAL SIALADENITIS: ICD-10-CM

## 2025-02-26 DIAGNOSIS — Z86.718 HISTORY OF DVT (DEEP VEIN THROMBOSIS): ICD-10-CM

## 2025-02-26 LAB
POC FINGERSTICK BLOOD GLUCOSE: 133 MG/DL (ref 70–100)
POC INR: 1.5 (ref 0.9–1.1)

## 2025-02-26 PROCEDURE — 3079F DIAST BP 80-89 MM HG: CPT | Performed by: FAMILY MEDICINE

## 2025-02-26 PROCEDURE — 1157F ADVNC CARE PLAN IN RCRD: CPT | Performed by: FAMILY MEDICINE

## 2025-02-26 PROCEDURE — 85610 PROTHROMBIN TIME: CPT | Performed by: FAMILY MEDICINE

## 2025-02-26 PROCEDURE — 82962 GLUCOSE BLOOD TEST: CPT | Performed by: FAMILY MEDICINE

## 2025-02-26 PROCEDURE — 1159F MED LIST DOCD IN RCRD: CPT | Performed by: FAMILY MEDICINE

## 2025-02-26 PROCEDURE — 1036F TOBACCO NON-USER: CPT | Performed by: FAMILY MEDICINE

## 2025-02-26 PROCEDURE — 1126F AMNT PAIN NOTED NONE PRSNT: CPT | Performed by: FAMILY MEDICINE

## 2025-02-26 PROCEDURE — 99214 OFFICE O/P EST MOD 30 MIN: CPT | Performed by: FAMILY MEDICINE

## 2025-02-26 PROCEDURE — 1160F RVW MEDS BY RX/DR IN RCRD: CPT | Performed by: FAMILY MEDICINE

## 2025-02-26 PROCEDURE — 1123F ACP DISCUSS/DSCN MKR DOCD: CPT | Performed by: FAMILY MEDICINE

## 2025-02-26 PROCEDURE — 3077F SYST BP >= 140 MM HG: CPT | Performed by: FAMILY MEDICINE

## 2025-02-26 RX ORDER — AMOXICILLIN AND CLAVULANATE POTASSIUM 875; 125 MG/1; MG/1
875 TABLET, FILM COATED ORAL 2 TIMES DAILY
Qty: 20 TABLET | Refills: 0 | Status: SHIPPED | OUTPATIENT
Start: 2025-02-26 | End: 2025-03-08

## 2025-02-26 ASSESSMENT — PAIN SCALES - GENERAL: PAINLEVEL_OUTOF10: 0-NO PAIN

## 2025-02-26 NOTE — PROGRESS NOTES
Subjective   Janice Avalos is a 84 y.o. female who presents for Follow-up (Follow up visit for coumadin check. Patient complains of upper respiratory problems for 3 weeks).    HPI  : Patient is an 84-year-old female started with submandibular swelling last Thursday or Friday.  She states it has gotten worse and she can hardly open her jaw and she had been to a dentist about a week ago but there were not any dental issues.  She has what appears to be right submandibular Sialadenitis.  It is significantly swollen and it is right under the right jaw where the submandibular gland is located.  She will need to take some antibiotics and I will refer her to ENT.  If her discomfort gets worse she will have to go to the ER for in-hospital treatment.    Objective  : ROS :10 systems were reviewed and the information is included in the HPI and no additional review of systems is indicated.    Physical Exam  Vitals and nursing note reviewed.   Constitutional:       Appearance: Normal appearance.      Comments: Patient is alert and oriented x3.   No acute distress   HENT:      Head: Normocephalic.      Right Ear: Tympanic membrane and external ear normal.      Left Ear: Tympanic membrane and external ear normal.      Ears:      Comments: Ears are patent bilaterally and TMs are clear.     Nose: Nose normal.      Mouth/Throat:      Mouth: Mucous membranes are moist.      Pharynx: Oropharynx is clear.      Comments: Difficulty visualizing the oral cavity since patient cannot really open her mouth due to a right submandibular swelling and tenderness.  She had been to a dentist about a week ago but did not have any dental issues.  This appears to be acute Sialadenitis.  She will need to see ENT and take an antibiotic until she can be evaluated.  Eyes:      Extraocular Movements: Extraocular movements intact.      Conjunctiva/sclera: Conjunctivae normal.      Pupils: Pupils are equal, round, and reactive to light.      Comments: Patient  does have visual problems and follows with a retinal specialist.   Neck:      Comments: No carotid bruits, no thyromegaly, no cervical adenopathy.  Occasional neck spasm and restriction of motion secondary to stress and tension.  Cardiovascular:      Rate and Rhythm: Normal rate. Rhythm irregular.      Pulses: Normal pulses.      Heart sounds: Normal heart sounds.      Comments: History of paroxysmal atrial fibrillation and is on Coumadin.  Patient denies chest pain .  Heart rhythm is stable S1 and S2 are noted.  Pulmonary:      Effort: Pulmonary effort is normal.      Breath sounds: Normal breath sounds.      Comments: Patient denies any coughing or wheezing.  Lungs are clear to auscultation.    Abdominal:      General: Bowel sounds are normal.      Palpations: Abdomen is soft.      Comments: Abdomen is soft and non tender.  No flank tenderness.  No suprapubic pain.  Positive bowel sounds .  No abdominal guarding and no rebound tenderness.   Genitourinary:     Comments: Patient denies dysuria, no hematuria, no nocturia, denies flank pain.  Musculoskeletal:         General: Tenderness present. Normal range of motion.      Cervical back: Normal range of motion.      Comments: History of lumbar compression fracture and chronic arthritis in the spine.  Unsteady gait and does use a cane.  Lumbosacral disc disease and sciatica.   Skin:     General: Skin is warm and dry.      Coloration: Skin is pale.      Comments: There is no bruising, no erythema, no skin lesions noted, no rashes.   Neurological:      General: No focal deficit present.      Mental Status: She is alert and oriented to person, place, and time. Mental status is at baseline.      Comments: No focal neurosensory deficits are noted.  Patient denies any peripheral neuropathy.  Coordination and gait are stable.  Normal muscle strength upper and lower extremities.   Psychiatric:         Behavior: Behavior normal.         Thought Content: Thought content normal.          Judgment: Judgment normal.      Comments: Patient has increased anxiety due to her right lower jaw swelling which appears to be the submandibular gland.  Thought content and judgment are stable.  No signs of vascular dementia.  Behavior is normal.     PLAN : Patient is an 84-year-old female who was evaluated today with painful swelling of her right submandibular gland.  She states she had been to the dentist and the dentist did not think there was any dental problems but she probably does have sial adenitis, either from a stone blockage or infection.  She will be started on Augmentin 875 mg twice daily and referred to ENT on a stat basis.  I told patient if the pain gets worse and she cannot get into ENT by tomorrow she should go to the emergency room for further evaluation.  Patient was agreeable with this approach and hopefully the swelling will go down by tomorrow.  Blood sugar today was 133 and her Coumadin level was 1.5%.  She will need to take an extra dose of Coumadin today.    Problem List Items Addressed This Visit       Hyperglycemia    Relevant Orders    POCT fingerstick glucose manually resulted    Anticoagulated on Coumadin    Relevant Orders    POCT INR manually resulted     Other Visit Diagnoses       Acute bacterial sialadenitis    -  Primary    Relevant Medications    amoxicillin-pot clavulanate (Augmentin) 875-125 mg tablet    Other Relevant Orders    Referral to ENT                 David Granados DO

## 2025-02-27 ENCOUNTER — OFFICE VISIT (OUTPATIENT)
Dept: OTOLARYNGOLOGY | Facility: CLINIC | Age: 85
End: 2025-02-27
Payer: MEDICARE

## 2025-02-27 VITALS
WEIGHT: 141 LBS | OXYGEN SATURATION: 96 % | HEART RATE: 83 BPM | DIASTOLIC BLOOD PRESSURE: 68 MMHG | TEMPERATURE: 98.1 F | HEIGHT: 65 IN | BODY MASS INDEX: 23.49 KG/M2 | RESPIRATION RATE: 18 BRPM | SYSTOLIC BLOOD PRESSURE: 138 MMHG

## 2025-02-27 DIAGNOSIS — K11.20 SUBMANDIBULAR SIALOADENITIS: Primary | ICD-10-CM

## 2025-02-27 PROCEDURE — 1123F ACP DISCUSS/DSCN MKR DOCD: CPT | Performed by: STUDENT IN AN ORGANIZED HEALTH CARE EDUCATION/TRAINING PROGRAM

## 2025-02-27 PROCEDURE — 1159F MED LIST DOCD IN RCRD: CPT | Performed by: STUDENT IN AN ORGANIZED HEALTH CARE EDUCATION/TRAINING PROGRAM

## 2025-02-27 PROCEDURE — 1125F AMNT PAIN NOTED PAIN PRSNT: CPT | Performed by: STUDENT IN AN ORGANIZED HEALTH CARE EDUCATION/TRAINING PROGRAM

## 2025-02-27 PROCEDURE — 3078F DIAST BP <80 MM HG: CPT | Performed by: STUDENT IN AN ORGANIZED HEALTH CARE EDUCATION/TRAINING PROGRAM

## 2025-02-27 PROCEDURE — 3075F SYST BP GE 130 - 139MM HG: CPT | Performed by: STUDENT IN AN ORGANIZED HEALTH CARE EDUCATION/TRAINING PROGRAM

## 2025-02-27 PROCEDURE — 1157F ADVNC CARE PLAN IN RCRD: CPT | Performed by: STUDENT IN AN ORGANIZED HEALTH CARE EDUCATION/TRAINING PROGRAM

## 2025-02-27 PROCEDURE — 99213 OFFICE O/P EST LOW 20 MIN: CPT | Performed by: STUDENT IN AN ORGANIZED HEALTH CARE EDUCATION/TRAINING PROGRAM

## 2025-02-27 PROCEDURE — 1160F RVW MEDS BY RX/DR IN RCRD: CPT | Performed by: STUDENT IN AN ORGANIZED HEALTH CARE EDUCATION/TRAINING PROGRAM

## 2025-02-27 PROCEDURE — 1036F TOBACCO NON-USER: CPT | Performed by: STUDENT IN AN ORGANIZED HEALTH CARE EDUCATION/TRAINING PROGRAM

## 2025-02-27 RX ORDER — METHYLPREDNISOLONE 4 MG/1
TABLET ORAL
Qty: 21 TABLET | Refills: 0 | Status: SHIPPED | OUTPATIENT
Start: 2025-02-27 | End: 2025-03-05

## 2025-02-27 ASSESSMENT — ENCOUNTER SYMPTOMS: DEPRESSION: 1

## 2025-02-27 ASSESSMENT — PAIN SCALES - GENERAL: PAINLEVEL_OUTOF10: 10-WORST PAIN EVER

## 2025-02-27 NOTE — PROGRESS NOTES
"SUBJECTIVE  Patient ID: Janice Avalos is a 84 y.o. female who presents for Neck Pain (Janice Avalos is here for concerns of swollen neck gland/redness. Unable to complete all screenings hard for patient to speak & move head d/t neck pain/swollen).    History 3/21/2024:  Referred by Dr. Granados.  Patient was last seen by me in October 2021 for dizziness and hearing concerns.    The patient reports that she has been following a thyroid nodules for some time. Had an ultrasound in July 2023 which has shown growth of a left-sided thyroid nodule. She has noted growth of the nodule and she is having choking episodes when lying flat. Was recommended by Dr. Mendes to get an ultrasound-guided biopsy. She opted not to get this done because of an upsetting experience at [facility] getting the thyroid ultrasound. She notes voice issues which she suspects is due to allergies. She notes a history of heartburn and hiatal hernia. She reports that food and pills get \"caught\" in her throat.    Update 2/27/2025:  Follow-up for new concern. Patient urgently referred by Dr. Granados regarding throat swelling.  The patient reports that roughly 9 days ago she began appreciating swelling of the right neck.  She attempted to get into see her primary care doctor but this took some time.  She has been in and out of the emergency department but not for this issue; she has been dealing with health problems for her .  She was seen by Dr. Granados yesterday and prescribed Augmentin to help with sialadenitis.  She has not appreciated significant improvement yet in symptoms.  She does not recall any preceding illness, although she does note that her health has not been the same since recent COVID-19 infection.    OBJECTIVE  Physical Exam  CONSTITUTIONAL: Well appearing female who appears stated age.  PSYCHIATRIC: Alert, appropriate mood and affect.  RESPIRATORY: Normal inspiration and expiration and chest wall expansion; no use of accessory muscles " to breathe.  HEAD, FACE, AND SKIN: Severe edema and overlying erythema of the right submandibular triangle. Foul odor.  Exquisitely tender to palpation and gland massage; unable to appreciate purulence with milking.    ASSESSMENT/PLAN  Diagnoses and all orders for this visit:  Submandibular sialoadenitis  -     methylPREDNISolone (Medrol Dospak) 4 mg tablets; Follow schedule on package instructions      84 y.o. female presenting with multiple issues.    0.  Right submandibular sialoadenitis  The patient is presenting with over a week of increasing pain and tenderness overlying the right submandibular gland.  On exam she has severe erythema and edema surrounding the right submandibular gland consistent with sialoadenitis.  Patient recently started antibiotics evening of 2/26/2025 without improvement.  At this time she has significant trismus.    We discussed her options and she is very reluctant to go to the emergency department at this time.  I strongly advised her to go to the emergency department if she does not appreciate significant improvement in swelling and pain in the next 24 hours this would indicate a failure of outpatient therapy.  I am willing to trial an oral steroid to help with symptoms (hydrocortisone on allergy list but previously on other steroids without issue).  We also discussed conservative measures including massage, heat, and sialagogues.    Multinodular goiter, growing thyroid nodules  The patient reports that she has been following a growing thyroid nodule of the left thyroid gland for some time.  She notes that this is been persistently growing recent ultrasound July 2023 demonstrated growth and concerning enough findings that biopsy was recommended.  Several other smaller nodules also meet criteria for biopsy. She is now s/p several biopsies, all with benign results. Follow-up ultrasound for August 2025 recommended.    Dysphonia, presbylarynx  The patient is also noted change in voicing  and on exam has slight breathiness and roughness of voicing.  She had attributed this to environmental allergies.  Given her growing thyroid nodule I recommended evaluation with flexible laryngoscopy.  This thankfully demonstrated bilateral and symmetric vocal fold motion.  However, there does appear to be loss of the vocal fold line consistent with history of presbylarynx.  This was disclosed to the patient.  Depending on how much this bothers her she could consider evaluation with speech therapy or laryngology.  She should have the thyroid issues resolved first.    Dysphagia  Patient is also noted changes in swallowing and some difficulties.  MBS and follow-up esophagram were relatively benign; known hiatal hernia appreciated.    This note was created using speech recognition transcription software. Despite proofreading, typographical errors may be present that affect the meaning of the content. Please contact my office with any questions.

## 2025-02-27 NOTE — PATIENT INSTRUCTIONS
Please go the emergency department if you are not appreciating improvement in swelling and symptoms 48 hours after starting antibiotics. Go immediately if you are having trouble swallowing or develop shortness of breath.    Taking oral steroids comes with certain risks. One can experience changes in mood, changes in sleep patterns, and memory issues or confusion. In the short term steroids can worsen one's Diabetes and raise blood sugar levels. They can also increase the pressure in one's eyes if one is susceptible to glaucoma. There is also risk of gaining weight. Taking these medications in the long term can result in clouded vision (cataracts), an increased risk of infections, changes in skin or increased bruising, and risk of fractures or joint destruction (avascular necrosis of the hip).

## 2025-04-03 ENCOUNTER — APPOINTMENT (OUTPATIENT)
Dept: UROLOGY | Facility: CLINIC | Age: 85
End: 2025-04-03
Payer: MEDICARE

## 2025-05-27 ENCOUNTER — TELEPHONE (OUTPATIENT)
Dept: PRIMARY CARE | Facility: CLINIC | Age: 85
End: 2025-05-27
Payer: MEDICARE

## 2025-05-27 NOTE — TELEPHONE ENCOUNTER
Pt said she has not done her blood test for POC INR since Feb and she also said she lost some weigh and has her jaw locking up.Pt did say she been going thru a lot with her .I did schedule pt for June 10th.

## 2025-06-02 ENCOUNTER — APPOINTMENT (OUTPATIENT)
Dept: PRIMARY CARE | Facility: CLINIC | Age: 85
End: 2025-06-02
Payer: MEDICARE

## 2025-06-02 VITALS
DIASTOLIC BLOOD PRESSURE: 72 MMHG | BODY MASS INDEX: 22.96 KG/M2 | WEIGHT: 138 LBS | SYSTOLIC BLOOD PRESSURE: 137 MMHG | HEART RATE: 82 BPM | TEMPERATURE: 97.7 F | OXYGEN SATURATION: 96 %

## 2025-06-02 DIAGNOSIS — N32.81 OVERACTIVE BLADDER: ICD-10-CM

## 2025-06-02 DIAGNOSIS — B96.89 ACUTE BACTERIAL SIALADENITIS: ICD-10-CM

## 2025-06-02 DIAGNOSIS — Z79.01 ANTICOAGULATED ON COUMADIN: ICD-10-CM

## 2025-06-02 DIAGNOSIS — M17.11 PRIMARY OSTEOARTHRITIS OF RIGHT KNEE: ICD-10-CM

## 2025-06-02 DIAGNOSIS — Z86.718 HISTORY OF DVT (DEEP VEIN THROMBOSIS): Primary | ICD-10-CM

## 2025-06-02 DIAGNOSIS — I82.5Z3 CHRONIC DEEP VEIN THROMBOSIS (DVT) OF DISTAL VEIN OF BOTH LOWER EXTREMITIES: ICD-10-CM

## 2025-06-02 DIAGNOSIS — H53.9 VISION DISTURBANCE: ICD-10-CM

## 2025-06-02 DIAGNOSIS — K11.21 ACUTE BACTERIAL SIALADENITIS: ICD-10-CM

## 2025-06-02 DIAGNOSIS — I80.3 THROMBOPHLEBITIS OF LOWER EXTREMITIES: ICD-10-CM

## 2025-06-02 DIAGNOSIS — M51.9 LUMBOSACRAL DISC DISEASE: ICD-10-CM

## 2025-06-02 DIAGNOSIS — R60.0 SUBMANDIBULAR GLAND SWELLING: ICD-10-CM

## 2025-06-02 DIAGNOSIS — S32.040S CLOSED COMPRESSION FRACTURE OF L4 VERTEBRA, SEQUELA: ICD-10-CM

## 2025-06-02 DIAGNOSIS — E01.0 THYROMEGALY: ICD-10-CM

## 2025-06-02 DIAGNOSIS — E04.1 NODULE OF LEFT LOBE OF THYROID GLAND: ICD-10-CM

## 2025-06-02 DIAGNOSIS — R73.03 PRE-DIABETES: ICD-10-CM

## 2025-06-02 LAB
POC FINGERSTICK BLOOD GLUCOSE: 120 MG/DL (ref 70–100)
POC INR: 1.9 (ref 0.9–1.1)

## 2025-06-02 PROCEDURE — 85610 PROTHROMBIN TIME: CPT | Performed by: FAMILY MEDICINE

## 2025-06-02 PROCEDURE — 1159F MED LIST DOCD IN RCRD: CPT | Performed by: FAMILY MEDICINE

## 2025-06-02 PROCEDURE — 99214 OFFICE O/P EST MOD 30 MIN: CPT | Performed by: FAMILY MEDICINE

## 2025-06-02 PROCEDURE — 1036F TOBACCO NON-USER: CPT | Performed by: FAMILY MEDICINE

## 2025-06-02 PROCEDURE — 3078F DIAST BP <80 MM HG: CPT | Performed by: FAMILY MEDICINE

## 2025-06-02 PROCEDURE — 1125F AMNT PAIN NOTED PAIN PRSNT: CPT | Performed by: FAMILY MEDICINE

## 2025-06-02 PROCEDURE — 82962 GLUCOSE BLOOD TEST: CPT | Performed by: FAMILY MEDICINE

## 2025-06-02 PROCEDURE — 1160F RVW MEDS BY RX/DR IN RCRD: CPT | Performed by: FAMILY MEDICINE

## 2025-06-02 PROCEDURE — 3075F SYST BP GE 130 - 139MM HG: CPT | Performed by: FAMILY MEDICINE

## 2025-06-02 RX ORDER — AMOXICILLIN AND CLAVULANATE POTASSIUM 500; 125 MG/1; MG/1
1 TABLET, FILM COATED ORAL 2 TIMES DAILY
Qty: 20 TABLET | Refills: 0 | Status: SHIPPED | OUTPATIENT
Start: 2025-06-02

## 2025-06-02 ASSESSMENT — PAIN SCALES - GENERAL: PAINLEVEL_OUTOF10: 6

## 2025-06-07 DIAGNOSIS — I10 PRIMARY HYPERTENSION: ICD-10-CM

## 2025-06-09 RX ORDER — AMLODIPINE BESYLATE 10 MG/1
10 TABLET ORAL DAILY
Qty: 90 TABLET | Refills: 3 | Status: SHIPPED | OUTPATIENT
Start: 2025-06-09

## 2025-06-10 ENCOUNTER — APPOINTMENT (OUTPATIENT)
Dept: PRIMARY CARE | Facility: CLINIC | Age: 85
End: 2025-06-10
Payer: MEDICARE

## 2025-06-19 ENCOUNTER — TELEPHONE (OUTPATIENT)
Dept: PRIMARY CARE | Facility: CLINIC | Age: 85
End: 2025-06-19
Payer: MEDICARE

## 2025-08-04 ENCOUNTER — APPOINTMENT (OUTPATIENT)
Dept: PRIMARY CARE | Facility: CLINIC | Age: 85
End: 2025-08-04
Payer: MEDICARE

## 2025-08-04 VITALS
SYSTOLIC BLOOD PRESSURE: 110 MMHG | TEMPERATURE: 97.8 F | HEIGHT: 65 IN | RESPIRATION RATE: 16 BRPM | OXYGEN SATURATION: 95 % | BODY MASS INDEX: 22.99 KG/M2 | HEART RATE: 61 BPM | WEIGHT: 138 LBS | DIASTOLIC BLOOD PRESSURE: 58 MMHG

## 2025-08-04 DIAGNOSIS — I82.5Z3 CHRONIC DEEP VEIN THROMBOSIS (DVT) OF DISTAL VEIN OF BOTH LOWER EXTREMITIES: ICD-10-CM

## 2025-08-04 DIAGNOSIS — E55.9 VITAMIN D DEFICIENCY: ICD-10-CM

## 2025-08-04 DIAGNOSIS — R73.9 HYPERGLYCEMIA: ICD-10-CM

## 2025-08-04 DIAGNOSIS — H34.8322 TRIBUTARY (BRANCH) RETINAL VEIN OCCLUSION, LEFT EYE, STABLE: ICD-10-CM

## 2025-08-04 DIAGNOSIS — I27.82 CHRONIC PULMONARY EMBOLISM WITHOUT ACUTE COR PULMONALE, UNSPECIFIED PULMONARY EMBOLISM TYPE (MULTI): ICD-10-CM

## 2025-08-04 DIAGNOSIS — E11.9 DIABETES MELLITUS WITHOUT COMPLICATION: ICD-10-CM

## 2025-08-04 DIAGNOSIS — R53.83 MALAISE AND FATIGUE: ICD-10-CM

## 2025-08-04 DIAGNOSIS — I10 PRIMARY HYPERTENSION: ICD-10-CM

## 2025-08-04 DIAGNOSIS — R73.03 PRE-DIABETES: ICD-10-CM

## 2025-08-04 DIAGNOSIS — E78.5 DYSLIPIDEMIA: ICD-10-CM

## 2025-08-04 DIAGNOSIS — Z00.00 ROUTINE GENERAL MEDICAL EXAMINATION AT HEALTH CARE FACILITY: Primary | ICD-10-CM

## 2025-08-04 DIAGNOSIS — R53.81 MALAISE AND FATIGUE: ICD-10-CM

## 2025-08-04 LAB
POC FINGERSTICK BLOOD GLUCOSE: 167 MG/DL (ref 70–100)
POC INR: 2 (ref 0.9–1.1)

## 2025-08-04 PROCEDURE — 99214 OFFICE O/P EST MOD 30 MIN: CPT | Performed by: FAMILY MEDICINE

## 2025-08-04 PROCEDURE — 1170F FXNL STATUS ASSESSED: CPT | Performed by: FAMILY MEDICINE

## 2025-08-04 PROCEDURE — 1036F TOBACCO NON-USER: CPT | Performed by: FAMILY MEDICINE

## 2025-08-04 PROCEDURE — 3078F DIAST BP <80 MM HG: CPT | Performed by: FAMILY MEDICINE

## 2025-08-04 PROCEDURE — G0439 PPPS, SUBSEQ VISIT: HCPCS | Performed by: FAMILY MEDICINE

## 2025-08-04 PROCEDURE — 82962 GLUCOSE BLOOD TEST: CPT | Performed by: FAMILY MEDICINE

## 2025-08-04 PROCEDURE — 1126F AMNT PAIN NOTED NONE PRSNT: CPT | Performed by: FAMILY MEDICINE

## 2025-08-04 PROCEDURE — 85610 PROTHROMBIN TIME: CPT | Performed by: FAMILY MEDICINE

## 2025-08-04 PROCEDURE — 3074F SYST BP LT 130 MM HG: CPT | Performed by: FAMILY MEDICINE

## 2025-08-04 PROCEDURE — 1159F MED LIST DOCD IN RCRD: CPT | Performed by: FAMILY MEDICINE

## 2025-08-04 PROCEDURE — 1160F RVW MEDS BY RX/DR IN RCRD: CPT | Performed by: FAMILY MEDICINE

## 2025-08-04 ASSESSMENT — ENCOUNTER SYMPTOMS
LOSS OF SENSATION IN FEET: 0
DEPRESSION: 0
OCCASIONAL FEELINGS OF UNSTEADINESS: 1

## 2025-08-04 ASSESSMENT — ACTIVITIES OF DAILY LIVING (ADL)
DOING_HOUSEWORK: INDEPENDENT
MANAGING_FINANCES: INDEPENDENT
DRESSING: INDEPENDENT
TAKING_MEDICATION: INDEPENDENT
GROCERY_SHOPPING: NEEDS ASSISTANCE
BATHING: INDEPENDENT

## 2025-08-04 ASSESSMENT — PAIN SCALES - GENERAL: PAINLEVEL_OUTOF10: 0-NO PAIN

## 2025-08-04 NOTE — PROGRESS NOTES
Subjective   Janice Avalos is a 84 y.o. female who presents for Medicare Annual Wellness Visit Subsequent (Patient here for annual Medicare wellness visit today. Patient will have Coumadin level checked today. Patient getting over a dental infection and is doing much better.).    HPI : Patient is an 84-year-old female who presents for her annual Medicare wellness exam..  Patient will answer questions as presented by the medical assistant.  She does have a living will and medical power of .  Patient will also have complete blood work done today, a physical exam and review of medication.  She finally did have her right posterior molar removed that was cracked and causing her jaw swelling for several months.  She had been through several different antibiotics and she had seen several different dentists and ENT physicians.  She also still has her left thyroid swelling and is seeing an ENT concerning that problem.  Patient is also still on Coumadin for her history of pulmonary emboli and chronic DVTs.  She will have her INR checked again today.    Objective  : ROS : 10 systems were reviewed and the information is included in the HPI and no additional review of systems is indicated.    Physical Exam  Vitals and nursing note reviewed.   Constitutional:       Appearance: Normal appearance.      Comments: Patient is alert and oriented x3.   No acute distress   HENT:      Head: Normocephalic.      Right Ear: Tympanic membrane and external ear normal.      Left Ear: Tympanic membrane and external ear normal.      Ears:      Comments: Ears are patent bilaterally and TMs are clear.     Nose: Nose normal.      Mouth/Throat:      Mouth: Mucous membranes are moist.      Pharynx: Oropharynx is clear.      Comments: Had right posterior bottom molar  removed that was cracked causing her infection and jaw swelling. Mouth is moist, tongue is midline.  No posterior pharyngeal erythema.    Eyes:      Extraocular Movements:  Extraocular movements intact.      Conjunctiva/sclera: Conjunctivae normal.      Pupils: Pupils are equal, round, and reactive to light.      Comments: Chronic visual problems  and follows  with retinal specialist.  She has to wear tinted glasses due to sunlight and some glare.  Has macular  problems  and injections in eyes. Follows with Dr Garber.    Neck:      Comments: Cervical restriction of motion  duet arthritis and DJD.   Left Thyroid   swelling and has seen several specialists.  She has an upcoming appointment with ENT Dr. Thornton with the [facility].  Cardiovascular:      Rate and Rhythm: Normal rate and regular rhythm.      Pulses: Normal pulses.      Heart sounds: Normal heart sounds.      Comments: Patient denies chest pain and no palpitations.  Heart rhythm is stable S1 and S2 are noted, no ectopics.  History of previous pulmonary emboli and has been on Coumadin for many years.  Also history of recurrent DVTs.     Pulmonary:      Breath sounds: Normal breath sounds.      Comments: Shallow  depth of inspiration  . Lungs are clear to auscultation.    Abdominal:      General: Bowel sounds are normal.      Palpations: Abdomen is soft.      Comments: Abdomen is soft and non tender. Had GI upset from hamburger today.  No flank tenderness.  No suprapubic pain.  Positive bowel sounds .  No abdominal guarding and no rebound tenderness.   Genitourinary:     Comments: Patient denies dysuria, no hematuria, denies flank pain.  Nocturia.      Musculoskeletal:         General: Tenderness present. Normal range of motion.      Cervical back: Normal range of motion. Tenderness present.      Comments: Knees with arthritis pains and right knee tends to give out.  Age-related arthritis in the joints.  Restriction of motion cervical and lumbar spines due to arthritis   and  muscle spasm.  Patient is having a walk-in shower placed since she can no longer climb over the side of the bathtub.       Skin:     General: Skin  is warm.      Comments: There is no bruising, no erythema, no skin lesions noted, no rashes.     Neurological:      General: No focal deficit present.      Mental Status: She is alert and oriented to person, place, and time. Mental status is at baseline.      Sensory: Sensory deficit present.      Coordination: Coordination abnormal.      Comments: Occasional peripheral leg paresthesias from lumbosacral disc disease.  No focal neurosensory deficits are noted.  Patient does have mild peripheral neuropathy.  Currently he is not using any cane or walker and her gait and coordination are stable.  Normal muscle strength upper and lower extremities.   Psychiatric:         Mood and Affect: Mood normal.         Behavior: Behavior normal.         Thought Content: Thought content normal.         Judgment: Judgment normal.      Comments: Patient has normal mood and affect.  Thought content and judgment are stable.  Patient is mentally very good for the age of 84.  Behavior is normal.     PLAN : Patient is an 84  year old female in for her medicare wellness exam. Patient did answer  the Medicare questions and has a living will and medical power of .  Patient also had complete blood work drawn and will be notified of those results in 3 days.  Further recommendations will be made at that time.  Today her blood sugar was 167 but she had eaten prior to the visit.  Her Coumadin INR was 2.0.  Patient will continue her Coumadin at the current dose.  She will follow-up in 3 months or sooner as needed.  She also will be notified of all her blood results and further recommendations will be made after review of all these results.  She is doing much better since she had that right posterior cracked molar removed and the swelling in inflammation of her jaw has resolved.    Problem List Items Addressed This Visit       HTN (hypertension)    Hyperglycemia    Pre-diabetes    Pulmonary embolus    Vitamin D deficiency    Deep vein  thrombosis (DVT) of both lower extremities    Malaise and fatigue    Dyslipidemia            David Granados, DO

## 2025-08-05 LAB
25(OH)D3+25(OH)D2 SERPL-MCNC: 49 NG/ML (ref 30–100)
ALBUMIN SERPL-MCNC: 4 G/DL (ref 3.6–5.1)
ALP SERPL-CCNC: 58 U/L (ref 37–153)
ALT SERPL-CCNC: 10 U/L (ref 6–29)
ANION GAP SERPL CALCULATED.4IONS-SCNC: 13 MMOL/L (CALC) (ref 7–17)
AST SERPL-CCNC: 14 U/L (ref 10–35)
BILIRUB SERPL-MCNC: 0.6 MG/DL (ref 0.2–1.2)
BUN SERPL-MCNC: 14 MG/DL (ref 7–25)
CALCIUM SERPL-MCNC: 9.1 MG/DL (ref 8.6–10.4)
CHLORIDE SERPL-SCNC: 106 MMOL/L (ref 98–110)
CHOLEST SERPL-MCNC: 215 MG/DL
CHOLEST/HDLC SERPL: 3.1 (CALC)
CO2 SERPL-SCNC: 21 MMOL/L (ref 20–32)
CREAT SERPL-MCNC: 0.63 MG/DL (ref 0.6–0.95)
EGFRCR SERPLBLD CKD-EPI 2021: 88 ML/MIN/1.73M2
ERYTHROCYTE [DISTWIDTH] IN BLOOD BY AUTOMATED COUNT: 13.9 % (ref 11–15)
GLUCOSE SERPL-MCNC: 171 MG/DL (ref 65–99)
HCT VFR BLD AUTO: 39.4 % (ref 35–45)
HDLC SERPL-MCNC: 70 MG/DL
HGB BLD-MCNC: 12.7 G/DL (ref 11.7–15.5)
LDLC SERPL CALC-MCNC: 122 MG/DL (CALC)
MCH RBC QN AUTO: 30.9 PG (ref 27–33)
MCHC RBC AUTO-ENTMCNC: 32.2 G/DL (ref 32–36)
MCV RBC AUTO: 95.9 FL (ref 80–100)
NONHDLC SERPL-MCNC: 145 MG/DL (CALC)
PLATELET # BLD AUTO: 244 THOUSAND/UL (ref 140–400)
PMV BLD REES-ECKER: 10.2 FL (ref 7.5–12.5)
POTASSIUM SERPL-SCNC: 3.6 MMOL/L (ref 3.5–5.3)
PROT SERPL-MCNC: 6.5 G/DL (ref 6.1–8.1)
RBC # BLD AUTO: 4.11 MILLION/UL (ref 3.8–5.1)
SODIUM SERPL-SCNC: 140 MMOL/L (ref 135–146)
TRIGL SERPL-MCNC: 121 MG/DL
TSH SERPL-ACNC: 0.54 MIU/L (ref 0.4–4.5)
WBC # BLD AUTO: 4.6 THOUSAND/UL (ref 3.8–10.8)

## 2025-10-07 ENCOUNTER — APPOINTMENT (OUTPATIENT)
Dept: PRIMARY CARE | Facility: CLINIC | Age: 85
End: 2025-10-07
Payer: MEDICARE